# Patient Record
Sex: FEMALE | Race: BLACK OR AFRICAN AMERICAN | NOT HISPANIC OR LATINO | Employment: FULL TIME | URBAN - METROPOLITAN AREA
[De-identification: names, ages, dates, MRNs, and addresses within clinical notes are randomized per-mention and may not be internally consistent; named-entity substitution may affect disease eponyms.]

---

## 2021-02-05 ENCOUNTER — RECORDS - HEALTHEAST (OUTPATIENT)
Dept: LAB | Facility: CLINIC | Age: 66
End: 2021-02-05

## 2021-02-05 LAB
SARS-COV-2 PCR COMMENT: ABNORMAL
SARS-COV-2 RNA SPEC QL NAA+PROBE: POSITIVE
SARS-COV-2 VIRUS SPECIMEN SOURCE: ABNORMAL

## 2021-06-23 ENCOUNTER — OFFICE VISIT (OUTPATIENT)
Dept: INTERNAL MEDICINE | Facility: CLINIC | Age: 66
End: 2021-06-23
Payer: COMMERCIAL

## 2021-06-23 VITALS
RESPIRATION RATE: 16 BRPM | SYSTOLIC BLOOD PRESSURE: 132 MMHG | DIASTOLIC BLOOD PRESSURE: 84 MMHG | WEIGHT: 171.6 LBS | HEIGHT: 66 IN | TEMPERATURE: 98.2 F | HEART RATE: 91 BPM | OXYGEN SATURATION: 96 % | BODY MASS INDEX: 27.58 KG/M2

## 2021-06-23 DIAGNOSIS — Z23 NEED FOR VACCINATION: ICD-10-CM

## 2021-06-23 DIAGNOSIS — Z12.11 COLON CANCER SCREENING: ICD-10-CM

## 2021-06-23 DIAGNOSIS — E11.9 CONTROLLED TYPE 2 DIABETES MELLITUS WITHOUT COMPLICATION, WITH LONG-TERM CURRENT USE OF INSULIN (H): ICD-10-CM

## 2021-06-23 DIAGNOSIS — E78.2 MIXED HYPERLIPIDEMIA: ICD-10-CM

## 2021-06-23 DIAGNOSIS — Z79.4 CONTROLLED TYPE 2 DIABETES MELLITUS WITHOUT COMPLICATION, WITH LONG-TERM CURRENT USE OF INSULIN (H): ICD-10-CM

## 2021-06-23 DIAGNOSIS — I10 ESSENTIAL HYPERTENSION: ICD-10-CM

## 2021-06-23 DIAGNOSIS — Z00.00 ENCOUNTER FOR PREVENTATIVE ADULT HEALTH CARE EXAMINATION: Primary | ICD-10-CM

## 2021-06-23 LAB
ERYTHROCYTE [DISTWIDTH] IN BLOOD BY AUTOMATED COUNT: 14.8 % (ref 10–15)
HBA1C MFR BLD: 10.6 % (ref 0–5.6)
HCT VFR BLD AUTO: 40.4 % (ref 35–47)
HGB BLD-MCNC: 13.1 G/DL (ref 11.7–15.7)
MCH RBC QN AUTO: 26.1 PG (ref 26.5–33)
MCHC RBC AUTO-ENTMCNC: 32.4 G/DL (ref 31.5–36.5)
MCV RBC AUTO: 81 FL (ref 78–100)
PLATELET # BLD AUTO: 362 10E9/L (ref 150–450)
RBC # BLD AUTO: 5.01 10E12/L (ref 3.8–5.2)
WBC # BLD AUTO: 6.7 10E9/L (ref 4–11)

## 2021-06-23 PROCEDURE — G0009 ADMIN PNEUMOCOCCAL VACCINE: HCPCS | Performed by: INTERNAL MEDICINE

## 2021-06-23 PROCEDURE — 80053 COMPREHEN METABOLIC PANEL: CPT | Performed by: INTERNAL MEDICINE

## 2021-06-23 PROCEDURE — 83036 HEMOGLOBIN GLYCOSYLATED A1C: CPT | Performed by: INTERNAL MEDICINE

## 2021-06-23 PROCEDURE — 85027 COMPLETE CBC AUTOMATED: CPT | Performed by: INTERNAL MEDICINE

## 2021-06-23 PROCEDURE — G0123 SCREEN CERV/VAG THIN LAYER: HCPCS | Performed by: INTERNAL MEDICINE

## 2021-06-23 PROCEDURE — 80061 LIPID PANEL: CPT | Performed by: INTERNAL MEDICINE

## 2021-06-23 PROCEDURE — 36415 COLL VENOUS BLD VENIPUNCTURE: CPT | Performed by: INTERNAL MEDICINE

## 2021-06-23 PROCEDURE — 99387 INIT PM E/M NEW PAT 65+ YRS: CPT | Mod: 25 | Performed by: INTERNAL MEDICINE

## 2021-06-23 PROCEDURE — 87624 HPV HI-RISK TYP POOLED RSLT: CPT | Performed by: INTERNAL MEDICINE

## 2021-06-23 PROCEDURE — 99213 OFFICE O/P EST LOW 20 MIN: CPT | Mod: 25 | Performed by: INTERNAL MEDICINE

## 2021-06-23 PROCEDURE — 90670 PCV13 VACCINE IM: CPT | Performed by: INTERNAL MEDICINE

## 2021-06-23 PROCEDURE — 82043 UR ALBUMIN QUANTITATIVE: CPT | Performed by: INTERNAL MEDICINE

## 2021-06-23 PROCEDURE — 84443 ASSAY THYROID STIM HORMONE: CPT | Performed by: INTERNAL MEDICINE

## 2021-06-23 RX ORDER — AMLODIPINE BESYLATE 10 MG/1
10 TABLET ORAL ONCE
COMMUNITY
Start: 2021-04-09 | End: 2021-08-03

## 2021-06-23 RX ORDER — GLIPIZIDE 10 MG/1
10 TABLET, FILM COATED, EXTENDED RELEASE ORAL 2 TIMES DAILY
COMMUNITY
Start: 2021-06-11 | End: 2021-08-03

## 2021-06-23 RX ORDER — METOPROLOL SUCCINATE 50 MG/1
50 TABLET, EXTENDED RELEASE ORAL
COMMUNITY
Start: 2021-06-11 | End: 2021-08-03

## 2021-06-23 RX ORDER — METOPROLOL SUCCINATE 50 MG/1
TABLET, EXTENDED RELEASE ORAL
COMMUNITY
Start: 2021-04-06 | End: 2023-01-02

## 2021-06-23 SDOH — HEALTH STABILITY: MENTAL HEALTH: HOW OFTEN DO YOU HAVE A DRINK CONTAINING ALCOHOL?: NEVER

## 2021-06-23 ASSESSMENT — MIFFLIN-ST. JEOR: SCORE: 1340.12

## 2021-06-23 NOTE — NURSING NOTE
Prior to immunization administration, verified patients identity using patient s name and date of birth. Please see Immunization Activity for additional information.     Screening Questionnaire for Adult Immunization    Are you sick today?   No   Do you have allergies to medications, food, a vaccine component or latex?   No   Have you ever had a serious reaction after receiving a vaccination?   No   Do you have a long-term health problem with heart, lung, kidney, or metabolic disease (e.g., diabetes), asthma, a blood disorder, no spleen, complement component deficiency, a cochlear implant, or a spinal fluid leak?  Are you on long-term aspirin therapy?   No   Do you have cancer, leukemia, HIV/AIDS, or any other immune system problem?   No   Do you have a parent, brother, or sister with an immune system problem?   No   In the past 3 months, have you taken medications that affect  your immune system, such as prednisone, other steroids, or anticancer drugs; drugs for the treatment of rheumatoid arthritis, Crohn s disease, or psoriasis; or have you had radiation treatments?   No   Have you had a seizure, or a brain or other nervous system problem?   No   During the past year, have you received a transfusion of blood or blood    products, or been given immune (gamma) globulin or antiviral drug?   No   For women: Are you pregnant or is there a chance you could become       pregnant during the next month?   No   Have you received any vaccinations in the past 4 weeks?   No     Immunization questionnaire answers were all negative.        Per orders of Dr. Gray , injection of Prevnar 13  given by Emerald Olivera LPN. Patient instructed to remain in clinic for 15 minutes afterwards, and to report any adverse reaction to me immediately.       Screening performed by Emerald Olivera LPN on 6/23/2021 at 11:44 AM.
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
## 2021-06-23 NOTE — NURSING NOTE
"Physical and Establishing care  /84 (BP Location: Left arm, Patient Position: Sitting, Cuff Size: Adult Regular)   Pulse 91   Temp 98.2  F (36.8  C) (Oral)   Resp 16   Ht 1.676 m (5' 6\")   Wt 77.8 kg (171 lb 9.6 oz)   LMP  (LMP Unknown)   SpO2 96%   Breastfeeding No   BMI 27.70 kg/m       "

## 2021-06-24 LAB
ALBUMIN SERPL-MCNC: 3.4 G/DL (ref 3.4–5)
ALP SERPL-CCNC: 144 U/L (ref 40–150)
ALT SERPL W P-5'-P-CCNC: 21 U/L (ref 0–50)
ANION GAP SERPL CALCULATED.3IONS-SCNC: 5 MMOL/L (ref 3–14)
AST SERPL W P-5'-P-CCNC: 12 U/L (ref 0–45)
BILIRUB SERPL-MCNC: 0.2 MG/DL (ref 0.2–1.3)
BUN SERPL-MCNC: 16 MG/DL (ref 7–30)
CALCIUM SERPL-MCNC: 9.2 MG/DL (ref 8.5–10.1)
CHLORIDE SERPL-SCNC: 101 MMOL/L (ref 94–109)
CHOLEST SERPL-MCNC: 229 MG/DL
CO2 SERPL-SCNC: 29 MMOL/L (ref 20–32)
CREAT SERPL-MCNC: 0.7 MG/DL (ref 0.52–1.04)
CREAT UR-MCNC: 128 MG/DL
GFR SERPL CREATININE-BSD FRML MDRD: >90 ML/MIN/{1.73_M2}
GLUCOSE SERPL-MCNC: 268 MG/DL (ref 70–99)
HDLC SERPL-MCNC: 52 MG/DL
LDLC SERPL CALC-MCNC: 143 MG/DL
MICROALBUMIN UR-MCNC: 257 MG/L
MICROALBUMIN/CREAT UR: 200.78 MG/G CR (ref 0–25)
NONHDLC SERPL-MCNC: 177 MG/DL
POTASSIUM SERPL-SCNC: 4.1 MMOL/L (ref 3.4–5.3)
PROT SERPL-MCNC: 8.3 G/DL (ref 6.8–8.8)
SODIUM SERPL-SCNC: 135 MMOL/L (ref 133–144)
TRIGL SERPL-MCNC: 171 MG/DL
TSH SERPL DL<=0.005 MIU/L-ACNC: 1.04 MU/L (ref 0.4–4)

## 2021-06-25 LAB
COPATH REPORT: NORMAL
PAP: NORMAL

## 2021-06-28 LAB
FINAL DIAGNOSIS: NORMAL
HPV HR 12 DNA CVX QL NAA+PROBE: NEGATIVE
HPV16 DNA SPEC QL NAA+PROBE: NEGATIVE
HPV18 DNA SPEC QL NAA+PROBE: NEGATIVE
SPECIMEN DESCRIPTION: NORMAL
SPECIMEN SOURCE CVX/VAG CYTO: NORMAL

## 2021-07-01 DIAGNOSIS — E11.9 CONTROLLED TYPE 2 DIABETES MELLITUS WITHOUT COMPLICATION, WITH LONG-TERM CURRENT USE OF INSULIN (H): Primary | ICD-10-CM

## 2021-07-01 DIAGNOSIS — Z79.4 CONTROLLED TYPE 2 DIABETES MELLITUS WITHOUT COMPLICATION, WITH LONG-TERM CURRENT USE OF INSULIN (H): Primary | ICD-10-CM

## 2021-07-05 NOTE — TELEPHONE ENCOUNTER
Patient called to check status of medication refills  
Patient calls to check status of refill.    
Patient informed prescriptions e-scribed.  
Pending Prescriptions:                       Disp   Refills    insulin aspart prot & aspart (NOVOLOG MIX *                Sig: Inject 12 Units Subcutaneous as needed    metFORMIN (GLUCOPHAGE) 1000 MG tablet                        Routing refill request to provider for review/approval because:  Medication is reported/historical        
n/a

## 2021-07-09 ENCOUNTER — HOSPITAL ENCOUNTER (OUTPATIENT)
Facility: CLINIC | Age: 66
End: 2021-07-09
Attending: INTERNAL MEDICINE | Admitting: INTERNAL MEDICINE
Payer: COMMERCIAL

## 2021-07-10 DIAGNOSIS — Z11.59 ENCOUNTER FOR SCREENING FOR OTHER VIRAL DISEASES: ICD-10-CM

## 2021-08-02 ENCOUNTER — NURSE TRIAGE (OUTPATIENT)
Dept: NURSING | Facility: CLINIC | Age: 66
End: 2021-08-02

## 2021-08-02 NOTE — TELEPHONE ENCOUNTER
.Clinic Action Needed: Yes.     Reason for Call: patient calling requesting refill for the following medication.     amLODIPine (NORVASC) 10 MG tablet    metoprolol succinate ER (TOPROL-XL) 50 MG 24 hr tablet    glipiZIDE (GLUCOTROL XL) 10 MG 24 hr tablet    States has about 5 tablets left of each medication.      Routed to: AARON WEAVER [[id number]]    Feliciano Daniels RN  [facility]

## 2021-08-03 DIAGNOSIS — Z79.4 CONTROLLED TYPE 2 DIABETES MELLITUS WITHOUT COMPLICATION, WITH LONG-TERM CURRENT USE OF INSULIN (H): Primary | ICD-10-CM

## 2021-08-03 DIAGNOSIS — I10 ESSENTIAL HYPERTENSION: ICD-10-CM

## 2021-08-03 DIAGNOSIS — E11.9 CONTROLLED TYPE 2 DIABETES MELLITUS WITHOUT COMPLICATION, WITH LONG-TERM CURRENT USE OF INSULIN (H): Primary | ICD-10-CM

## 2021-08-03 RX ORDER — GLIPIZIDE 10 MG/1
10 TABLET, FILM COATED, EXTENDED RELEASE ORAL 2 TIMES DAILY
Qty: 90 TABLET | Refills: 0 | Status: SHIPPED | OUTPATIENT
Start: 2021-08-03 | End: 2021-10-11

## 2021-08-03 RX ORDER — AMLODIPINE BESYLATE 10 MG/1
10 TABLET ORAL DAILY
Qty: 90 TABLET | Refills: 1 | Status: SHIPPED | OUTPATIENT
Start: 2021-08-03 | End: 2022-01-21

## 2021-08-03 RX ORDER — METOPROLOL SUCCINATE 50 MG/1
50 TABLET, EXTENDED RELEASE ORAL DAILY
Qty: 90 TABLET | Refills: 1 | Status: SHIPPED | OUTPATIENT
Start: 2021-08-03

## 2021-08-03 NOTE — TELEPHONE ENCOUNTER
Refill request for Glipizide, Metoprolol, and Amlodipine.   Provider approval needed.     Last OV on 6/23/21    BP Readings from Last 3 Encounters:   06/23/21 132/84       Lab Results   Component Value Date    A1C 10.6 06/23/2021     Creatinine   Date Value Ref Range Status   06/23/2021 0.70 0.52 - 1.04 mg/dL Final

## 2021-10-07 DIAGNOSIS — Z79.4 CONTROLLED TYPE 2 DIABETES MELLITUS WITHOUT COMPLICATION, WITH LONG-TERM CURRENT USE OF INSULIN (H): ICD-10-CM

## 2021-10-07 DIAGNOSIS — E11.9 CONTROLLED TYPE 2 DIABETES MELLITUS WITHOUT COMPLICATION, WITH LONG-TERM CURRENT USE OF INSULIN (H): ICD-10-CM

## 2021-10-11 RX ORDER — GLIPIZIDE 10 MG/1
TABLET, FILM COATED, EXTENDED RELEASE ORAL
Qty: 90 TABLET | Refills: 0 | Status: SHIPPED | OUTPATIENT
Start: 2021-10-11 | End: 2021-11-30

## 2021-10-11 RX ORDER — FLURBIPROFEN SODIUM 0.3 MG/ML
SOLUTION/ DROPS OPHTHALMIC
Qty: 100 EACH | Refills: 1 | Status: SHIPPED | OUTPATIENT
Start: 2021-10-11 | End: 2022-01-21

## 2021-10-11 RX ORDER — INSULIN ASPART 100 [IU]/ML
INJECTION, SUSPENSION SUBCUTANEOUS
Qty: 15 ML | Refills: 1 | Status: SHIPPED | OUTPATIENT
Start: 2021-10-11 | End: 2022-01-07

## 2021-11-27 DIAGNOSIS — Z79.4 CONTROLLED TYPE 2 DIABETES MELLITUS WITHOUT COMPLICATION, WITH LONG-TERM CURRENT USE OF INSULIN (H): ICD-10-CM

## 2021-11-27 DIAGNOSIS — E11.9 CONTROLLED TYPE 2 DIABETES MELLITUS WITHOUT COMPLICATION, WITH LONG-TERM CURRENT USE OF INSULIN (H): ICD-10-CM

## 2021-11-30 RX ORDER — GLIPIZIDE 10 MG/1
TABLET, FILM COATED, EXTENDED RELEASE ORAL
Qty: 90 TABLET | Refills: 0 | Status: SHIPPED | OUTPATIENT
Start: 2021-11-30 | End: 2022-01-07

## 2021-11-30 NOTE — TELEPHONE ENCOUNTER
Routing refill request to provider for review/approval because:  Hemoglobin A1C   Date Value Ref Range Status   06/23/2021 10.6 (H) 0 - 5.6 % Final     Comment:     Results confirmed by repeat test  Normal <5.7% Prediabetes 5.7-6.4%  Diabetes 6.5% or higher - adopted from ADA   consensus guidelines.

## 2022-01-04 DIAGNOSIS — Z79.4 CONTROLLED TYPE 2 DIABETES MELLITUS WITHOUT COMPLICATION, WITH LONG-TERM CURRENT USE OF INSULIN (H): ICD-10-CM

## 2022-01-04 DIAGNOSIS — I10 ESSENTIAL HYPERTENSION: Primary | ICD-10-CM

## 2022-01-04 DIAGNOSIS — E11.9 CONTROLLED TYPE 2 DIABETES MELLITUS WITHOUT COMPLICATION, WITH LONG-TERM CURRENT USE OF INSULIN (H): ICD-10-CM

## 2022-01-06 DIAGNOSIS — Z79.4 CONTROLLED TYPE 2 DIABETES MELLITUS WITHOUT COMPLICATION, WITH LONG-TERM CURRENT USE OF INSULIN (H): ICD-10-CM

## 2022-01-06 DIAGNOSIS — E11.9 CONTROLLED TYPE 2 DIABETES MELLITUS WITHOUT COMPLICATION, WITH LONG-TERM CURRENT USE OF INSULIN (H): ICD-10-CM

## 2022-01-06 NOTE — TELEPHONE ENCOUNTER
Routing refill request to provider for review/approval because:  Labs not current:  A1C  Patient needs to be seen because:  due for diabetes visit    Pending Prescriptions:                       Disp   Refills    metoprolol succinate ER (TOPROL-XL) 50 MG *90 tab*1        Sig: TAKE ONE TABLET BY MOUTH ONCE DAILY    glipiZIDE (GLUCOTROL XL) 10 MG 24 hr table*90 tab*0        Sig: TAKE ONE TABLET BY MOUTH TWICE A DAY

## 2022-01-07 RX ORDER — METOPROLOL SUCCINATE 50 MG/1
TABLET, EXTENDED RELEASE ORAL
Qty: 90 TABLET | Refills: 1 | Status: SHIPPED | OUTPATIENT
Start: 2022-01-07 | End: 2023-01-02

## 2022-01-07 RX ORDER — INSULIN ASPART 100 [IU]/ML
INJECTION, SUSPENSION SUBCUTANEOUS
Qty: 15 ML | Refills: 1 | Status: SHIPPED | OUTPATIENT
Start: 2022-01-07 | End: 2022-03-09

## 2022-01-07 RX ORDER — GLIPIZIDE 10 MG/1
TABLET, FILM COATED, EXTENDED RELEASE ORAL
Qty: 90 TABLET | Refills: 0 | Status: SHIPPED | OUTPATIENT
Start: 2022-01-07 | End: 2022-03-09

## 2022-01-07 NOTE — TELEPHONE ENCOUNTER
Routing refill request to provider for review/approval because:  Labs not current:    Hemoglobin A1C POCT   Date Value Ref Range Status   06/23/2021 10.6 (H) 0 - 5.6 % Final     Comment:     Results confirmed by repeat test  Normal <5.7% Prediabetes 5.7-6.4%  Diabetes 6.5% or higher - adopted from ADA   consensus guidelines.         Patient needs to be seen because:  past due for DM appt  Mayte HERNANDEZ RN, BSN

## 2022-01-20 DIAGNOSIS — I10 ESSENTIAL HYPERTENSION: ICD-10-CM

## 2022-01-20 DIAGNOSIS — E11.9 CONTROLLED TYPE 2 DIABETES MELLITUS WITHOUT COMPLICATION, WITH LONG-TERM CURRENT USE OF INSULIN (H): ICD-10-CM

## 2022-01-20 DIAGNOSIS — Z79.4 CONTROLLED TYPE 2 DIABETES MELLITUS WITHOUT COMPLICATION, WITH LONG-TERM CURRENT USE OF INSULIN (H): ICD-10-CM

## 2022-01-21 RX ORDER — FLURBIPROFEN SODIUM 0.3 MG/ML
SOLUTION/ DROPS OPHTHALMIC
Qty: 100 EACH | Refills: 0 | Status: SHIPPED | OUTPATIENT
Start: 2022-01-21 | End: 2022-03-09

## 2022-01-21 RX ORDER — AMLODIPINE BESYLATE 10 MG/1
TABLET ORAL
Qty: 90 TABLET | Refills: 1 | Status: SHIPPED | OUTPATIENT
Start: 2022-01-21

## 2022-01-21 NOTE — TELEPHONE ENCOUNTER
Medication is being filled for 1 time refill only due to:  Patient needs to be seen because needs appt.    Routing to MA/TC pool. The Pt is due for a visit with PCP for DM  Mayte HERNANDEZ RN, BSN

## 2022-03-08 DIAGNOSIS — E11.9 CONTROLLED TYPE 2 DIABETES MELLITUS WITHOUT COMPLICATION, WITH LONG-TERM CURRENT USE OF INSULIN (H): ICD-10-CM

## 2022-03-08 DIAGNOSIS — Z79.4 CONTROLLED TYPE 2 DIABETES MELLITUS WITHOUT COMPLICATION, WITH LONG-TERM CURRENT USE OF INSULIN (H): ICD-10-CM

## 2022-03-08 NOTE — LETTER
[place] [address] [facility] [address]  [phone]        March 9, 2022      Ara Newby  [address] [facility] [address]          Dear Ara Newby    APPOINTMENT REMINDER:   Our records indicates that it is time for you to be seen for a recheck.     Your current medication request will be approved for one refill but you will need to be seen before any additional refills can be approved.    Taking care of your health is important to us, and ongoing visits with your provider are vital to your care.    We look forward to seeing you in the near future.  You may call our office at [phone] to schedule a visit.    Please disregard this notice if you have already made an appointment.      Sincerely,      [facility]

## 2022-03-09 RX ORDER — FLURBIPROFEN SODIUM 0.3 MG/ML
SOLUTION/ DROPS OPHTHALMIC
Qty: 200 EACH | Refills: 0 | Status: SHIPPED | OUTPATIENT
Start: 2022-03-09

## 2022-03-09 RX ORDER — GLIPIZIDE 10 MG/1
TABLET, FILM COATED, EXTENDED RELEASE ORAL
Qty: 90 TABLET | Refills: 0 | Status: SHIPPED | OUTPATIENT
Start: 2022-03-09 | End: 2022-03-30

## 2022-03-09 RX ORDER — INSULIN ASPART 100 [IU]/ML
INJECTION, SUSPENSION SUBCUTANEOUS
Qty: 45 ML | Refills: 0 | Status: SHIPPED | OUTPATIENT
Start: 2022-03-09

## 2022-03-09 NOTE — TELEPHONE ENCOUNTER
Last OV on 6/23/21  Will send letter to schedule OV.   Provider approval needed.     Lab Results   Component Value Date    A1C 10.6 06/23/2021

## 2022-03-19 ENCOUNTER — TELEPHONE (OUTPATIENT)
Dept: INTERNAL MEDICINE | Facility: CLINIC | Age: 67
End: 2022-03-19

## 2022-03-19 DIAGNOSIS — E11.9 CONTROLLED TYPE 2 DIABETES MELLITUS WITHOUT COMPLICATION, WITH LONG-TERM CURRENT USE OF INSULIN (H): Primary | ICD-10-CM

## 2022-03-19 DIAGNOSIS — Z79.4 CONTROLLED TYPE 2 DIABETES MELLITUS WITHOUT COMPLICATION, WITH LONG-TERM CURRENT USE OF INSULIN (H): Primary | ICD-10-CM

## 2022-03-19 NOTE — TELEPHONE ENCOUNTER
Patient is requesting a new prescription to be sent to us for contour next test strips. She is testing TID.

## 2022-03-19 NOTE — LETTER
[facility]  [address], [address]  [place], [place]  [address]                                            TEL:[phone]  FAX:[phone]      Ara Newby  [address] [facility] [address]      March 21, 2022    Dear Ara,    We have received a refill request from your pharmacy for your test strips. Many medications require routine follow-up with your provider and a review of your chart indicates that you are overdue for a diabete follow up. We have sent a one time 90 day refill to your pharmacy to allow you time to schedule an appointment.     Please call [phone] to schedule an appointment.  We look forward to seeing you in the near future.      Thank you,     [facility]

## 2022-03-21 NOTE — TELEPHONE ENCOUNTER
Medication is being filled for 1 time refill only due to:  Patient needs to be seen because pt is overdue for daibetic apt.     Letter was sent 3/9/22. Will send another.

## 2022-03-29 DIAGNOSIS — Z79.4 CONTROLLED TYPE 2 DIABETES MELLITUS WITHOUT COMPLICATION, WITH LONG-TERM CURRENT USE OF INSULIN (H): ICD-10-CM

## 2022-03-29 DIAGNOSIS — E11.9 CONTROLLED TYPE 2 DIABETES MELLITUS WITHOUT COMPLICATION, WITH LONG-TERM CURRENT USE OF INSULIN (H): ICD-10-CM

## 2022-03-30 RX ORDER — GLIPIZIDE 10 MG/1
TABLET, FILM COATED, EXTENDED RELEASE ORAL
Qty: 90 TABLET | Refills: 0 | Status: SHIPPED | OUTPATIENT
Start: 2022-03-30

## 2022-03-30 NOTE — TELEPHONE ENCOUNTER
Routing refill request to provider for review/approval because:  Miryam given x1 and patient did not follow up, please advise.  Mayte HERNANDEZ RN, BSN

## 2023-01-02 ENCOUNTER — OFFICE VISIT (OUTPATIENT)
Dept: URGENT CARE | Facility: URGENT CARE | Age: 68
End: 2023-01-02
Payer: OTHER MISCELLANEOUS

## 2023-01-02 ENCOUNTER — ANCILLARY PROCEDURE (OUTPATIENT)
Dept: GENERAL RADIOLOGY | Facility: CLINIC | Age: 68
End: 2023-01-02
Payer: OTHER MISCELLANEOUS

## 2023-01-02 VITALS
SYSTOLIC BLOOD PRESSURE: 150 MMHG | DIASTOLIC BLOOD PRESSURE: 82 MMHG | BODY MASS INDEX: 28.5 KG/M2 | TEMPERATURE: 98.3 F | OXYGEN SATURATION: 98 % | WEIGHT: 176.6 LBS | HEART RATE: 79 BPM
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
## 2023-01-02 DIAGNOSIS — S52.615A CLOSED NONDISPLACED FRACTURE OF STYLOID PROCESS OF LEFT ULNA, INITIAL ENCOUNTER: ICD-10-CM

## 2023-01-02 DIAGNOSIS — S52.592A OTHER CLOSED FRACTURE OF DISTAL END OF LEFT RADIUS, INITIAL ENCOUNTER: Primary | ICD-10-CM

## 2023-01-02 PROCEDURE — 73090 X-RAY EXAM OF FOREARM: CPT | Mod: TC | Performed by: RADIOLOGY

## 2023-01-02 PROCEDURE — 73130 X-RAY EXAM OF HAND: CPT | Mod: TC | Performed by: RADIOLOGY

## 2023-01-02 PROCEDURE — 99214 OFFICE O/P EST MOD 30 MIN: CPT

## 2023-01-02 NOTE — PROGRESS NOTES
Assessment & Plan   (S52.521X) Other closed fracture of distal end of left radius, initial encounter  (primary encounter diagnosis)  Plan: ARM SLING L, Orthopedic  Referral,         Wrist/Arm/Hand Supplies Order for DME - ONLY         FOR DME    (S52.023V) Closed nondisplaced fracture of styloid process of left ulna, initial encounter    (W19.XXXA) Fall, initial encounter  Plan: XR Forearm Left 2 Views, XR Hand Left G/E 3         Views    Informed the patient that she has 2 broken bones near her wrist.  We discussed the need to keep the splint on and wear the sling until follow-up with orthopedics.  Informed the patient that orthopedics will contact her within 1-2 business days to schedule the appointment and I also identified the phone number she can use to call them for follow-up.  Instructed her to use ice and elevate her left arm to help with the pain and swelling.  We also discussed using Tylenol as needed for pain with the maximum dose being 4000 mg in a 24-hour period of time.  Informed her to return to clinic with any new or worsening symptoms.  Patient acknowledged her understanding of the above plan.    AMELIA Vincent [facility] [place]    Conor Bullock is a 67 year old female who presents to clinic today for the following health issues:  Chief Complaint   Patient presents with     Work Comp     Fall     HPI  Patient slipped and fell on the ice last Wednesday.  She reports having left wrist and lower arm pain.   The patient reports the pain as 8/10 and is described as a throbbing pain. The patient denies any numbness or tingling.  The patient has been using Tylenol and ibuprofen for pain.     Review of Systems  Negative except noted above.      Objective    Physical Exam   GENERAL: healthy, alert and no distress  MS: decreased range of motion left wrist due to pain, edema noted between the middle of the left lower arm and left wrist, tenderness to  palpation distal radial and ulnar portion of the left wrist  SKIN: no suspicious lesions or rashes

## 2023-01-02 NOTE — PATIENT INSTRUCTIONS
Keep the splint on and wear the sling until follow up with orthopedics.  Use ice and elevate your left arm to help with the pain and swelling.  You can use Tylenol as needed for pain.  Maximum dose of Tylenol is 4000mg in a 24 hour period of time.

## 2023-01-02 NOTE — LETTER
January 2, 2023      Ara Newby  [address] [facility] [address]        To Whom It May Concern:    Ara Newby  was seen on January 2, 2023.  Please provide her with light work duties due to injury until followed up by orthopedics.        Sincerely,        AMELIA Vincent CNP

## 2023-01-14 ENCOUNTER — APPOINTMENT (OUTPATIENT)
Dept: GENERAL RADIOLOGY | Facility: CLINIC | Age: 68
End: 2023-01-14
Attending: FAMILY MEDICINE
Payer: OTHER MISCELLANEOUS

## 2023-01-14 ENCOUNTER — HOSPITAL ENCOUNTER (EMERGENCY)
Facility: CLINIC | Age: 68
Discharge: HOME | End: 2023-01-14
Attending: FAMILY MEDICINE | Admitting: FAMILY MEDICINE
Payer: OTHER MISCELLANEOUS

## 2023-01-14 VITALS
SYSTOLIC BLOOD PRESSURE: 151 MMHG | HEART RATE: 88 BPM | OXYGEN SATURATION: 99 % | TEMPERATURE: 97.9 F | RESPIRATION RATE: 16 BRPM | DIASTOLIC BLOOD PRESSURE: 88 MMHG

## 2023-01-14 DIAGNOSIS — S52.502A CLOSED FRACTURE OF DISTAL ENDS OF LEFT RADIUS AND ULNA, INITIAL ENCOUNTER: ICD-10-CM

## 2023-01-14 DIAGNOSIS — S52.602A CLOSED FRACTURE OF DISTAL ENDS OF LEFT RADIUS AND ULNA, INITIAL ENCOUNTER: ICD-10-CM

## 2023-01-14 PROCEDURE — 99284 EMERGENCY DEPT VISIT MOD MDM: CPT | Performed by: FAMILY MEDICINE

## 2023-01-14 PROCEDURE — 99284 EMERGENCY DEPT VISIT MOD MDM: CPT | Mod: 25 | Performed by: FAMILY MEDICINE

## 2023-01-14 PROCEDURE — 29125 APPL SHORT ARM SPLINT STATIC: CPT | Mod: LT | Performed by: FAMILY MEDICINE

## 2023-01-14 PROCEDURE — 73110 X-RAY EXAM OF WRIST: CPT | Mod: LT

## 2023-01-14 PROCEDURE — 250N000013 HC RX MED GY IP 250 OP 250 PS 637: Performed by: FAMILY MEDICINE

## 2023-01-14 RX ORDER — OXYCODONE HYDROCHLORIDE 5 MG/1
5 TABLET ORAL ONCE
Status: COMPLETED | OUTPATIENT
Start: 2023-01-14 | End: 2023-01-14

## 2023-01-14 RX ORDER — IBUPROFEN 200 MG
600 TABLET ORAL EVERY 6 HOURS PRN
Qty: 30 TABLET | Refills: 0 | Status: SHIPPED | OUTPATIENT
Start: 2023-01-14

## 2023-01-14 RX ORDER — OXYCODONE HYDROCHLORIDE 5 MG/1
5 TABLET ORAL EVERY 6 HOURS PRN
Qty: 12 TABLET | Refills: 0 | Status: SHIPPED | OUTPATIENT
Start: 2023-01-14 | End: 2023-01-17

## 2023-01-14 RX ADMIN — OXYCODONE HYDROCHLORIDE 5 MG: 5 TABLET ORAL at 10:52

## 2023-01-14 ASSESSMENT — ACTIVITIES OF DAILY LIVING (ADL): ADLS_ACUITY_SCORE: 35

## 2023-01-14 NOTE — LETTER
[facility] EMERGENCY DEPARTMENT  [address]  [place] [address]  [phone]      2023    Ara Newby  [address] [facility]  [place] [address]  [phone] (home)     : 1955      To Whom it may concern:    Ara Newby was seen in our Emergency Department today, 2023 for an injury that was reported to be work related.      For the next 1 days she should not work    The employee might be taking medication so that they cannot operate moving machinery or perform activities that require balancing or working above ground.    After returning to work the following restrictions apply for 3 days:   No use of left upper extremity.    The employee must keep the injured site elevated.  Patient will need to wear the splint.  Must follow-up with orthopedics for further work instructions.    Sincerely,    Saul West MD

## 2023-01-14 NOTE — ED NOTES
Pt to discharge home. Home rx and AVS discussed with  ipad. Answered all questions at this time. Sling provided.

## 2023-01-14 NOTE — ED TRIAGE NOTES
Had a fall on December 28th. Arm is in a sling and ace wrapped. Increased pain started Tuesday     Triage Assessment     Row Name 01/14/23 0952       Triage Assessment (Adult)    Airway WDL WDL       Respiratory WDL    Respiratory WDL WDL       Skin Circulation/Temperature WDL    Skin Circulation/Temperature WDL WDL       Cardiac WDL    Cardiac WDL WDL       Peripheral/Neurovascular WDL    Peripheral Neurovascular WDL WDL       Cognitive/Neuro/Behavioral WDL    Cognitive/Neuro/Behavioral WDL WDL

## 2023-01-14 NOTE — DISCHARGE INSTRUCTIONS
Wear splint and sling, ice and elevate is much as possible.  Use ibuprofen, Tylenol or oxycodone for pain.  Follow-up with orthopedics.  See phone number below if they do not contact you within the next 1-2 business days.  Please make an appointment to follow up with Orthopedics Clinic (phone: [phone]) as soon as possible.

## 2023-01-14 NOTE — ED PROVIDER NOTES
ED Provider Note  [facility]      History     Chief Complaint   Patient presents with     Arm Pain     Fell dec 28th. New pain started tuesday     HPI  Aravenessa Newby is a 67 year old female with history of DMII, HTN, and recent left radial head and ulnar styloid process (fall 12/28, XR on 1/2 at ) who presents to the ED with increased left arm pain since 1/10.     Per chart review patient was seen at  Urgent Care in [place] on 1/2 with left wrist and lower arm pain after a fall on 12/28. XE showed distal radial fracture as well as nondisplaced fracture of the ulnar styloid process. Given splint and sling and Ortho to contact in 1-2 days for f/u appt. Advised ice and elevation w/ Tylenol for pain. Patient called by Ortho 1/12 to schedule appt as non-operative start. Patient then returned call with report of increased pain since 1/6. Advised to be seen in Urgent Care, however, pt without ride until 10 pm when son returned home. Advised if new or worsening sx pt should be seen in ED.     XR Hand Left 1/2/23  IMPRESSION: Acute fracture of the distal radial metaphysis with  extension to the radiocarpal articular surface. Slight impaction and  the metaphysis. Nondisplaced fracture of the ulnar styloid. Mild  degenerative changes in the wrist and hand.    XR Forearm 1/2/23  IMPRESSION: Acute fracture of the distal radial metaphysis and ulnar  styloid. No proximal fractures are evident.     Past Medical History  Past Medical History:   Diagnosis Date     Diabetes mellitus, type 2 (H)      Hypertension      History reviewed. No pertinent surgical history.  amLODIPine (NORVASC) 10 MG tablet  blood glucose (NO BRAND SPECIFIED) test strip  glipiZIDE (GLUCOTROL XL) 10 MG 24 hr tablet  ibuprofen (ADVIL/MOTRIN) 200 MG tablet  insulin aspart prot & aspart (NOVOLOG MIX 70/30 FLEXPEN) (70-30) 100 UNIT/ML pen  insulin pen needle (B-D U/F) 31G X 5 MM miscellaneous  metFORMIN (GLUCOPHAGE) 1000 MG  tablet  metoprolol succinate ER (TOPROL-XL) 50 MG 24 hr tablet  oxyCODONE (ROXICODONE) 5 MG tablet      No Known Allergies  Family History  History reviewed. No pertinent family history.  Social History   Social History     Tobacco Use     Smoking status: Never     Smokeless tobacco: Never   Substance Use Topics     Alcohol use: Never     Drug use: Never      Past medical history, past surgical history, medications, allergies, family history, and social history were reviewed with the patient. No additional pertinent items.      A complete review of systems was performed with pertinent positives and negatives noted in the HPI, and all other systems negative.    Physical Exam   BP: (!) 151/88  Pulse: 88  Temp: 97.9  F (36.6  C)  Resp: 16  SpO2: 99 %  Physical Exam  Vitals and nursing note reviewed.   Constitutional:       General: She is not in acute distress.     Appearance: She is not diaphoretic.   HENT:      Head: Atraumatic.      Mouth/Throat:      Pharynx: No oropharyngeal exudate.   Eyes:      General: No scleral icterus.     Pupils: Pupils are equal, round, and reactive to light.   Cardiovascular:      Heart sounds: Normal heart sounds.   Pulmonary:      Effort: No respiratory distress.      Breath sounds: Normal breath sounds.   Abdominal:      General: Bowel sounds are normal.      Palpations: Abdomen is soft.      Tenderness: There is no abdominal tenderness.   Musculoskeletal:      Left wrist: Swelling and tenderness present. No deformity. Normal pulse.      Comments: Patient is wearing his splint on the left wrist which appears to be sliding out of position.  The sensation and capillary refill is normal in the digits, there is no sign of compartment syndrome.  Splint removed the skin is intact.   Skin:     General: Skin is warm.      Findings: No rash.   Neurological:      General: No focal deficit present.      Mental Status: She is oriented to person, place, and time.           ED Course, Procedures, &  Data      Procedures                     Results for orders placed or performed during the hospital encounter of 01/14/23   XR Wrist Left G/E 3 Views     Status: None    Narrative    EXAM: XR WRIST LEFT G/E 3 VIEWS  LOCATION: [facility]  DATE/TIME: 01/14/2023, 11:03 AM    INDICATION: Known fracture, splint displaced, increased pain.  COMPARISON: 01/02/2023.      Impression    IMPRESSION: Again seen is an impacted intra-articular distal radius fracture and slightly distracted ulnar styloid fracture. Fracture planes are still visible without significant interval healing since the previous study. No new findings. Small lucency   in the thumb metacarpal shaft measuring 6 mm, nonspecific but may reflect an enchondroma.       Medications   oxyCODONE (ROXICODONE) tablet 5 mg (5 mg Oral Given 1/14/23 1052)     Labs Ordered and Resulted from Time of ED Arrival to Time of ED Departure - No data to display  XR Wrist Left G/E 3 Views   Final Result   IMPRESSION: Again seen is an impacted intra-articular distal radius fracture and slightly distracted ulnar styloid fracture. Fracture planes are still visible without significant interval healing since the previous study. No new findings. Small lucency    in the thumb metacarpal shaft measuring 6 mm, nonspecific but may reflect an enchondroma.                Medical Decision Making  The patient presented with a problem that is an acute complicated injury.    The patient's evaluation involved:  review of 1 prior external note(s) (see separate area of note for details)  ordering and review of 1 test(s) (see separate area of note for details)    The patient's management involved prescription drug management.      Assessment & Plan    A 67-year-old female who fell on December 28 and suffered a nondisplaced intra-articular distal radius and ulna fracture.  Presenting now for the second time (first time on January 6, 2023 to urgent care) due  to intractable pain at the site of the fracture.  On exam she appears in only mild distress.  She has a splint in place which appears to have slipped some and is somewhat out of position.  There is no sign of vascular or neurologic compromise of her digits.  The splint was removed and there is no breakdown of the skin.  Radial pulse normal, no new deformity.  Imaging was repeated this does not show any new displacement or changes in the appearance of the fracture.  She was replaced in a plaster sugar-tong splint and reported substantial improvement in her pain.  Was also treated with oxycodone in the ED.  Based on the clinical findings and the entire clinical scenario, the patient appears stable at this time to be treated symptomatically, and to follow-up as an outpatient for any further evaluation and treatment.  Will refer to orthopedics for consultation regarding ongoing management discussed expected course, need for follow up, and indications for return with the patient.  See discharge instructions.      I have reviewed the nursing notes. I have reviewed the findings, diagnosis, plan and need for follow up with the patient.    Discharge Medication List as of 1/14/2023 11:36 AM      START taking these medications    Details   ibuprofen (ADVIL/MOTRIN) 200 MG tablet Take 3 tablets (600 mg) by mouth every 6 hours as needed for mild pain, Disp-30 tablet, R-0, Local Print      oxyCODONE (ROXICODONE) 5 MG tablet Take 1 tablet (5 mg) by mouth every 6 hours as needed for pain, Disp-12 tablet, R-0, Local Print             Final diagnoses:   Closed fracture of distal ends of left radius and ulna, initial encounter       Saul West MD  [facility] ED EMERGENCY DEPARTMENT  1/14/2023     Saul West MD  01/14/23 2100

## 2023-01-16 NOTE — PROGRESS NOTES
Chief Complaint:   Chief Complaint   Patient presents with     Left Wrist - Pain     W/C. Fracture. DOI 12/28/22, 3 wk s/p. Patient notes she was taking out the trash can and she fell on the snow injuring her left wrist. She was seen at the hospital twice. She was placed in a plaster splint but had trouble with it so she went back and they put her in a different splint. She has remained in splint. Hand appears to be swollen.       INJURY: nondisplaced left distal radius fracture  DATE of INJURY: 12/28/2022    Today's encounter utilized a language specific  via iPad to obtain the HPI, PAST MEDICAL/SURGICAL history, social history, family history, medications and allergies and review of systems; in addition to perform physical examination; review pertinent imaging studies; discuss exam findings and assessment; and go over treatment plan.    Ara Newby is seen today in the [facility] for evaluation of left wrist fracture  at the request of Dr. Saul West         HPI: Ara Newby is a 67 year old female , right -hand dominant, who presents for evaluation and management of a left wrist injury. 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  Seen in Urgent Care 1/2/2023, with xrays of the forearm obtained noting a nondisplaced distal radius and ulna fracture. Had worsened pain due to the splint not fitting well, so presented to [facility] ED on 1/14/2023, with repeat xrays showing stable fracture. Resplinted. Presents today for followup management, overall improved.    Takes tylenol, oxycodone for pain.      It has been 3 weeks since the initial injury.      She is diabetic, last A1c=10.6  in our system 6/23/2021.      She reports having mild pain/discomfort around the injury site. She denies associated numbness or tingling. She denies any other injuries to her upper extremity.   Symptoms: pain,  swelling.  Location of symptoms: left wrist.  Pain severity: 3/10  Pain quality: dull, aching and throbbing  Frequency of symptoms: are constant  Aggravating factors: movements.  Relieving factors: rest, splint, pain medications (oxycodone, tylenol).      Past medical history:  has a past medical history of Diabetes mellitus, type 2 (H) and Hypertension.   There are no problems to display for this patient.    Past surgical history:  has no past surgical history on file.     Medications:    Current Outpatient Medications   Medication Sig Dispense Refill     amLODIPine (NORVASC) 10 MG tablet TAKE ONE TABLET BY MOUTH ONCE DAILY 90 tablet 1     blood glucose (NO BRAND SPECIFIED) test strip Use to test blood sugar 3 times daily or as directed. 300 strip 0     glipiZIDE (GLUCOTROL XL) 10 MG 24 hr tablet TAKE ONE TABLET BY MOUTH TWICE A DAY 90 tablet 0     ibuprofen (ADVIL/MOTRIN) 200 MG tablet Take 3 tablets (600 mg) by mouth every 6 hours as needed for mild pain 30 tablet 0     insulin aspart prot & aspart (NOVOLOG MIX 70/30 FLEXPEN) (70-30) 100 UNIT/ML pen INJECT 16 UNITS TWICE DAILY AS NEEDED FOR HIGH BLOOD SUGAR 45 mL 0     insulin pen needle (B-D U/F) 31G X 5 MM miscellaneous Use twice daily with Novolog pen 200 each 0     metFORMIN (GLUCOPHAGE) 1000 MG tablet Take 1 tablet (1,000 mg) by mouth 2 times daily (with meals) 360 tablet 1     metoprolol succinate ER (TOPROL-XL) 50 MG 24 hr tablet Take 1 tablet (50 mg) by mouth daily 90 tablet 1     oxyCODONE (ROXICODONE) 5 MG tablet Take 1 tablet (5 mg) by mouth every 6 hours as needed for pain 12 tablet 0        Allergies:   No Known Allergies     Family History: family history is not on file.     Social History:  reports that she has never smoked. She has never used smokeless tobacco. She reports that she does not drink alcohol and does not use drugs.    Review of Systems:  ROS: 10 point ROS neg other than the symptoms noted above in the HPI and past medical  "history.    Physical Exam  GENERAL APPEARANCE: healthy, alert, no distress.   SKIN: no suspicious lesions or rashes  NEURO: Normal strength and tone, mentation intact and speech normal  PSYCH:  mentation appears normal and affect normal. Not anxious.  RESPIRATORY: No increased work of breathing.    BP (!) 148/80   Pulse 93   Ht 1.676 m (5' 6\")   Wt 80.7 kg (178 lb)   LMP  (LMP Unknown)   BMI 28.73 kg/m       left WRIST/HAND EXAM:    The splint was removed    Skin intact. Resolving volar ecchymosis.  Normal wear pattern, color and tone.    There is mild swelling in the hand, fingers, wrist of the left upper extremity .  There is moderate tenderness in the distal radius, distal ulna of the wrist.  There is no maceration of the skin.  There is no gross deformity in the area.    Intact sensation light touch median, radial, ulnar nerves of the hand  Intact sensation to the radial and ulnar digital nerves of the fingers, as well as the finger tips.  Intact epl fpl fdp edc wrist flexion/extension biceps/triceps deltoid  Brisk capillary refill to all fingers.   Palpable radial pulse, 2+.    X-rays:  3 views left wrist from 1/14/2023 were reviewed in clinic today. On my review, stable alignment of previously noted impacted intra-articular distal radius fracture and slightly distracted ulnar styloid fracture. Fracture planes are still visible without significant interval healing since the previous study on 1/2/2023. No new findings. Small lucency in the thumb metacarpal shaft measuring 6 mm, nonspecific but may reflect an enchondroma. Findings grossly stable to images 1/2/2023.    .    Assessment: 66yo RHD female with nondisplaced, impacted left distal radius fracture status post fall..    Plan:  Nonop fracture management    * exam, images reviewed  * consistent with a fracture, reason for continued pain.  * will take 8-12 weeks to heal  * typically immobilize in cast/brace 6-8 weeks depending on fracture, healing.  * at " this time, will plan to place into a cast today..  * return to clinic 4 weeks, xrays left wrist out of cast, sooner if needed.  * over the counter pain control as needed. Will do a prescription for Norco today.  * non weight bearing left upper extremity, finger range of motion.    * All questions were addressed and answered prior to discharge from clinic today. The patient acknowledges an understanding of and agreement with the plan set forth during today's visit. Patient was advised to call our office or MyChart us if any further questions arise upon leaving our office today.        Kaushik Zhou M.D., M.S.  Dept. of Orthopaedic Surgery  [facility]      Cast/splint application    Date/Time: 1/19/2023 4:47 PM  Performed by: Devon Hernandez PA  Authorized by: Kaushik Zhou MD     Consent:     Consent obtained:  Verbal    Consent given by:  Patient  Pre-procedure details:     Sensation:  Normal  Procedure details:     Laterality:  Left    Location:  Wrist    Wrist:  L wrist    Cast type:  Short arm    Supplies:  Fiberglass  Post-procedure details:     Pain:  Unchanged    Sensation:  Normal    Patient tolerance of procedure:  Tolerated well, no immediate complications    Patient provided with cast or splint care instructions: Yes

## 2023-01-19 ENCOUNTER — OFFICE VISIT (OUTPATIENT)
Dept: ORTHOPEDICS | Facility: CLINIC | Age: 68
End: 2023-01-19
Payer: OTHER MISCELLANEOUS

## 2023-01-19 VITALS
WEIGHT: 178 LBS | HEIGHT: 66 IN | HEART RATE: 93 BPM | BODY MASS INDEX: 28.61 KG/M2 | SYSTOLIC BLOOD PRESSURE: 148 MMHG | DIASTOLIC BLOOD PRESSURE: 80 MMHG

## 2023-01-19 DIAGNOSIS — S52.602A CLOSED FRACTURE OF DISTAL ENDS OF LEFT RADIUS AND ULNA, INITIAL ENCOUNTER: Primary | ICD-10-CM

## 2023-01-19 DIAGNOSIS — S52.502A CLOSED FRACTURE OF DISTAL ENDS OF LEFT RADIUS AND ULNA, INITIAL ENCOUNTER: Primary | ICD-10-CM

## 2023-01-19 PROCEDURE — 25600 CLTX DST RDL FX/EPHYS SEP WO: CPT | Mod: LT | Performed by: ORTHOPAEDIC SURGERY

## 2023-01-19 PROCEDURE — 99203 OFFICE O/P NEW LOW 30 MIN: CPT | Mod: 57 | Performed by: ORTHOPAEDIC SURGERY

## 2023-01-19 RX ORDER — ACETAMINOPHEN 325 MG/1
TABLET ORAL
COMMUNITY
Start: 2022-12-27

## 2023-01-19 RX ORDER — HYDROCODONE BITARTRATE AND ACETAMINOPHEN 5; 325 MG/1; MG/1
1 TABLET ORAL EVERY 6 HOURS PRN
Qty: 10 TABLET | Refills: 0 | Status: SHIPPED | OUTPATIENT
Start: 2023-01-19 | End: 2023-01-22

## 2023-01-19 ASSESSMENT — PAIN SCALES - GENERAL: PAINLEVEL: MILD PAIN (3)

## 2023-01-19 NOTE — LETTER
1/19/2023         RE: Ara Newby  [id number] [address] [facility] [address]        Dear Colleague,    Thank you for referring your patient, Ara Newby, to the [facility] [place]. Please see a copy of my visit note below.    Chief Complaint:   Chief Complaint   Patient presents with     Left Wrist - Pain     W/C. Fracture. DOI 12/28/22, 3 wk s/p. Patient notes she was taking out the trash can and she fell on the snow injuring her left wrist. She was seen at the hospital twice. She was placed in a plaster splint but had trouble with it so she went back and they put her in a different splint. She has remained in splint. Hand appears to be swollen.       INJURY: nondisplaced left distal radius fracture  DATE of INJURY: 12/28/2022    Today's encounter utilized a language specific  via iPad to obtain the HPI, PAST MEDICAL/SURGICAL history, social history, family history, medications and allergies and review of systems; in addition to perform physical examination; review pertinent imaging studies; discuss exam findings and assessment; and go over treatment plan.    Ara Newby is seen today in the [facility] for evaluation of left wrist fracture  at the request of Dr. Saul West         HPI: Ara Newby is a 67 year old female , right -hand dominant, who presents for evaluation and management of a left wrist injury. 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  Seen in Urgent Care 1/2/2023, with xrays of the forearm obtained noting a nondisplaced distal radius and ulna fracture. Had worsened pain due to the splint not fitting well, so presented to [facility] ED on 1/14/2023, with repeat xrays showing stable fracture. Resplinted. Presents today for followup management, overall improved.    Takes tylenol, oxycodone for pain.      It has been 3 weeks since the initial injury.       She is diabetic, last A1c=10.6  in our system 6/23/2021.      She reports having mild pain/discomfort around the injury site. She denies associated numbness or tingling. She denies any other injuries to her upper extremity.   Symptoms: pain, swelling.  Location of symptoms: left wrist.  Pain severity: 3/10  Pain quality: dull, aching and throbbing  Frequency of symptoms: are constant  Aggravating factors: movements.  Relieving factors: rest, splint, pain medications (oxycodone, tylenol).      Past medical history:  has a past medical history of Diabetes mellitus, type 2 (H) and Hypertension.   There are no problems to display for this patient.    Past surgical history:  has no past surgical history on file.     Medications:    Current Outpatient Medications   Medication Sig Dispense Refill     amLODIPine (NORVASC) 10 MG tablet TAKE ONE TABLET BY MOUTH ONCE DAILY 90 tablet 1     blood glucose (NO BRAND SPECIFIED) test strip Use to test blood sugar 3 times daily or as directed. 300 strip 0     glipiZIDE (GLUCOTROL XL) 10 MG 24 hr tablet TAKE ONE TABLET BY MOUTH TWICE A DAY 90 tablet 0     ibuprofen (ADVIL/MOTRIN) 200 MG tablet Take 3 tablets (600 mg) by mouth every 6 hours as needed for mild pain 30 tablet 0     insulin aspart prot & aspart (NOVOLOG MIX 70/30 FLEXPEN) (70-30) 100 UNIT/ML pen INJECT 16 UNITS TWICE DAILY AS NEEDED FOR HIGH BLOOD SUGAR 45 mL 0     insulin pen needle (B-D U/F) 31G X 5 MM miscellaneous Use twice daily with Novolog pen 200 each 0     metFORMIN (GLUCOPHAGE) 1000 MG tablet Take 1 tablet (1,000 mg) by mouth 2 times daily (with meals) 360 tablet 1     metoprolol succinate ER (TOPROL-XL) 50 MG 24 hr tablet Take 1 tablet (50 mg) by mouth daily 90 tablet 1     oxyCODONE (ROXICODONE) 5 MG tablet Take 1 tablet (5 mg) by mouth every 6 hours as needed for pain 12 tablet 0        Allergies:   No Known Allergies     Family History: family history is not on file.     Social History:  reports that she  "has never smoked. She has never used smokeless tobacco. She reports that she does not drink alcohol and does not use drugs.    Review of Systems:  ROS: 10 point ROS neg other than the symptoms noted above in the HPI and past medical history.    Physical Exam  GENERAL APPEARANCE: healthy, alert, no distress.   SKIN: no suspicious lesions or rashes  NEURO: Normal strength and tone, mentation intact and speech normal  PSYCH:  mentation appears normal and affect normal. Not anxious.  RESPIRATORY: No increased work of breathing.    BP (!) 148/80   Pulse 93   Ht 1.676 m (5' 6\")   Wt 80.7 kg (178 lb)   LMP  (LMP Unknown)   BMI 28.73 kg/m       left WRIST/HAND EXAM:    The splint was removed    Skin intact. Resolving volar ecchymosis.  Normal wear pattern, color and tone.    There is mild swelling in the hand, fingers, wrist of the left upper extremity .  There is moderate tenderness in the distal radius, distal ulna of the wrist.  There is no maceration of the skin.  There is no gross deformity in the area.    Intact sensation light touch median, radial, ulnar nerves of the hand  Intact sensation to the radial and ulnar digital nerves of the fingers, as well as the finger tips.  Intact epl fpl fdp edc wrist flexion/extension biceps/triceps deltoid  Brisk capillary refill to all fingers.   Palpable radial pulse, 2+.    X-rays:  3 views left wrist from 1/14/2023 were reviewed in clinic today. On my review, stable alignment of previously noted impacted intra-articular distal radius fracture and slightly distracted ulnar styloid fracture. Fracture planes are still visible without significant interval healing since the previous study on 1/2/2023. No new findings. Small lucency in the thumb metacarpal shaft measuring 6 mm, nonspecific but may reflect an enchondroma. Findings grossly stable to images 1/2/2023.    .    Assessment: 66yo RHD female with nondisplaced, impacted left distal radius fracture status post " fall..    Plan:  Nonop fracture management    * exam, images reviewed  * consistent with a fracture, reason for continued pain.  * will take 8-12 weeks to heal  * typically immobilize in cast/brace 6-8 weeks depending on fracture, healing.  * at this time, will plan to place into a cast today..  * return to clinic 4 weeks, xrays left wrist out of cast, sooner if needed.  * over the counter pain control as needed. Will do a prescription for Norco today.  * non weight bearing left upper extremity, finger range of motion.    * All questions were addressed and answered prior to discharge from clinic today. The patient acknowledges an understanding of and agreement with the plan set forth during today's visit. Patient was advised to call our office or MyChart us if any further questions arise upon leaving our office today.        Kaushik Zhou M.D., M.S.  Dept. of Orthopaedic Surgery  [facility]      Cast/splint application    Date/Time: 1/19/2023 4:47 PM  Performed by: Devon Hernandez PA  Authorized by: Kaushik Zhou MD     Consent:     Consent obtained:  Verbal    Consent given by:  Patient  Pre-procedure details:     Sensation:  Normal  Procedure details:     Laterality:  Left    Location:  Wrist    Wrist:  L wrist    Cast type:  Short arm    Supplies:  Fiberglass  Post-procedure details:     Pain:  Unchanged    Sensation:  Normal    Patient tolerance of procedure:  Tolerated well, no immediate complications    Patient provided with cast or splint care instructions: Yes              Again, thank you for allowing me to participate in the care of your patient.        Sincerely,        Kaushik Zhou MD

## 2023-01-19 NOTE — LETTER
January 19, 2023      RE: Ara Newby  [id number] [address] [facility] [address]        To whom it may concern:     Ara Newby is under my care for   1. Closed fracture of distal ends of left radius and ulna, initial encounter        Work related injury: Yes       Date of injury: 12/28/22        Return to work date: 1/19/23     ** WITH RESTRICTIONS? Yes, with work restrictions: * Other: No use of left upper extremity.  DURATION OF LIMITATIONS: 4 weeks        Next appointment: 4 weeks        Electronically Signed (as below)  Dr. Kaushik Zhou M.D.

## 2023-02-13 NOTE — PROGRESS NOTES
Chief Complaint:   Chief Complaint   Patient presents with     Left Wrist - Follow Up     WORK COMP. Left wrist fracture. DOI: 12/28/22. Presents in well fitting, clean, dry cast. Reports doing well with no complaints or pain. Utilizing iPad interpretor services.      INJURY: nondisplaced left distal radius fracture  DATE of INJURY: 12/28/2022    Today's encounter utilized a language specific  via iPad to obtain the HPI, PAST MEDICAL/SURGICAL history, social history, family history, medications and allergies and review of systems; in addition to perform physical examination; review pertinent imaging studies; discuss exam findings and assessment; and go over treatment plan.        HPI: Ara Newby is a 67 year old female , right -hand dominant, who presents for followup evaluation and management of a left wrist injury. Returns today in cast, doing well. No much pain. No concerns. Pain today 0/10.    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  Seen in Urgent Care 1/2/2023, with xrays of the forearm obtained noting a nondisplaced distal radius and ulna fracture. Had worsened pain due to the splint not fitting well, so presented to [facility] ED on 1/14/2023, with repeat xrays showing stable fracture. Resplinted with improved pain.    It has been 7 weeks since the initial injury.      She is diabetic, last A1c=10.6  in our system 6/23/2021.          Past medical history:  has a past medical history of Diabetes mellitus, type 2 (H) and Hypertension.   There are no problems to display for this patient.    Past surgical history:  has no past surgical history on file.     Medications:    Current Outpatient Medications   Medication Sig Dispense Refill     acetaminophen (TYLENOL) 325 MG tablet        amLODIPine (NORVASC) 10 MG tablet TAKE ONE TABLET BY MOUTH ONCE DAILY 90 tablet 1     blood glucose (NO BRAND SPECIFIED) test strip Use to test blood sugar 3  "times daily or as directed. 300 strip 0     glipiZIDE (GLUCOTROL XL) 10 MG 24 hr tablet TAKE ONE TABLET BY MOUTH TWICE A DAY 90 tablet 0     ibuprofen (ADVIL/MOTRIN) 200 MG tablet Take 3 tablets (600 mg) by mouth every 6 hours as needed for mild pain 30 tablet 0     insulin aspart prot & aspart (NOVOLOG MIX 70/30 FLEXPEN) (70-30) 100 UNIT/ML pen INJECT 16 UNITS TWICE DAILY AS NEEDED FOR HIGH BLOOD SUGAR 45 mL 0     insulin pen needle (B-D U/F) 31G X 5 MM miscellaneous Use twice daily with Novolog pen 200 each 0     metFORMIN (GLUCOPHAGE) 1000 MG tablet Take 1 tablet (1,000 mg) by mouth 2 times daily (with meals) 360 tablet 1     metoprolol succinate ER (TOPROL-XL) 50 MG 24 hr tablet Take 1 tablet (50 mg) by mouth daily 90 tablet 1        Allergies:   No Known Allergies     Family History: family history is not on file.     Social History:  reports that she has never smoked. She has never used smokeless tobacco. She reports that she does not drink alcohol and does not use drugs.    Review of Systems:     Denies numbness, tingling, parasthesias.   Denies headaches.   Denies fevers, chills, night sweats   Denies chest pain.   Denies shortness of breath.   Denies any skin problems, abrasions, rashes, irritation.      Physical Exam  GENERAL APPEARANCE: healthy, alert, no distress.   SKIN: no suspicious lesions or rashes  NEURO: Normal strength and tone, mentation intact and speech normal  PSYCH:  mentation appears normal and affect normal. Not anxious.  RESPIRATORY: No increased work of breathing.    Ht 1.676 m (5' 6\")   Wt 80.7 kg (178 lb)   LMP  (LMP Unknown)   BMI 28.73 kg/m       left WRIST/HAND EXAM:    The castwas removed    Skin intact. Dry, flaking.  Normal wear pattern, color and tone.    There is mild swelling in the hand, fingers, wrist of the left upper extremity .  There is mild tenderness in the distal radius, distal ulna of the wrist.  There is no maceration of the skin.  There is no gross deformity in " the area.    Intact sensation light touch median, radial, ulnar nerves of the hand  Intact sensation to the radial and ulnar digital nerves of the fingers, as well as the finger tips.  Intact epl fpl fdp edc wrist flexion/extension biceps/triceps deltoid  Brisk capillary refill to all fingers.   Palpable radial pulse, 2+.    X-rays:  3 views left wrist from 2/16/2023 were reviewed in clinic today. On my review, stable alignment of previously noted impacted intra-articular distal radius fracture and slightly distracted ulnar styloid fracture. Fracture planes are less visible with increased sclerosis and signs of healing compared to previous images.. No new findings. Small lucency in the thumb metacarpal shaft measuring 6 mm, nonspecific but may reflect an enchondroma.     .    Assessment: 66yo RHD female with nondisplaced, impacted left distal radius fracture status post fall..    Plan:  Nonop fracture management    * exam, images reviewed  * fracture healing well, not yet fully healed.  * will take 8-12 weeks to heal  * will immbolize in removable wrist brace today.  * wear brace day and night, off at rest, hygiene, range of motion exercises (demonstrated today)  * wrist, finger range of motion.  * elevation.  * non weight bearing.    * return to clinic 4 weeks, xrays left wrist out of brace,  sooner if needed.      * All questions were addressed and answered prior to discharge from clinic today. The patient acknowledges an understanding of and agreement with the plan set forth during today's visit. Patient was advised to call our office or MyChart us if any further questions arise upon leaving our office today.        Kaushik Zhou M.D., M.S.  Dept. of Orthopaedic Surgery  [facility]      Procedures

## 2023-02-16 ENCOUNTER — OFFICE VISIT (OUTPATIENT)
Dept: ORTHOPEDICS | Facility: CLINIC | Age: 68
End: 2023-02-16
Payer: COMMERCIAL

## 2023-02-16 ENCOUNTER — ANCILLARY PROCEDURE (OUTPATIENT)
Dept: GENERAL RADIOLOGY | Facility: CLINIC | Age: 68
End: 2023-02-16
Attending: PHYSICIAN ASSISTANT
Payer: OTHER MISCELLANEOUS

## 2023-02-16 VITALS — HEIGHT: 66 IN | BODY MASS INDEX: 28.61 KG/M2 | WEIGHT: 178 LBS

## 2023-02-16 DIAGNOSIS — S52.602D CLOSED FRACTURE OF DISTAL ENDS OF LEFT RADIUS AND ULNA WITH ROUTINE HEALING, SUBSEQUENT ENCOUNTER: ICD-10-CM

## 2023-02-16 DIAGNOSIS — S52.502D CLOSED FRACTURE OF DISTAL ENDS OF LEFT RADIUS AND ULNA WITH ROUTINE HEALING, SUBSEQUENT ENCOUNTER: ICD-10-CM

## 2023-02-16 DIAGNOSIS — S52.502D CLOSED FRACTURE OF DISTAL ENDS OF LEFT RADIUS AND ULNA WITH ROUTINE HEALING, SUBSEQUENT ENCOUNTER: Primary | ICD-10-CM

## 2023-02-16 DIAGNOSIS — S52.602D CLOSED FRACTURE OF DISTAL ENDS OF LEFT RADIUS AND ULNA WITH ROUTINE HEALING, SUBSEQUENT ENCOUNTER: Primary | ICD-10-CM

## 2023-02-16 PROCEDURE — 99207 PR FRACTURE CARE IN GLOBAL PERIOD: CPT | Performed by: ORTHOPAEDIC SURGERY

## 2023-02-16 PROCEDURE — 73110 X-RAY EXAM OF WRIST: CPT | Mod: TC | Performed by: RADIOLOGY

## 2023-02-16 ASSESSMENT — PAIN SCALES - GENERAL: PAINLEVEL: NO PAIN (0)

## 2023-02-16 NOTE — LETTER
2/16/2023         RE: Ara Newby  [address]  [place] [address]        Dear Colleague,    Thank you for referring your patient, Ara Newby, to the [facility]. Please see a copy of my visit note below.    Chief Complaint:   Chief Complaint   Patient presents with     Left Wrist - Follow Up     WORK COMP. Left wrist fracture. DOI: 12/28/22. Presents in well fitting, clean, dry cast. Reports doing well with no complaints or pain. Utilizing iPad interpretor services.      INJURY: nondisplaced left distal radius fracture  DATE of INJURY: 12/28/2022    Today's encounter utilized a language specific  via iPad to obtain the HPI, PAST MEDICAL/SURGICAL history, social history, family history, medications and allergies and review of systems; in addition to perform physical examination; review pertinent imaging studies; discuss exam findings and assessment; and go over treatment plan.        HPI: Ara Newby is a 67 year old female , right -hand dominant, who presents for followup evaluation and management of a left wrist injury. Returns today in cast, doing well. No much pain. No concerns. Pain today 0/10.    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  Seen in Urgent Care 1/2/2023, with xrays of the forearm obtained noting a nondisplaced distal radius and ulna fracture. Had worsened pain due to the splint not fitting well, so presented to [facility] ED on 1/14/2023, with repeat xrays showing stable fracture. Resplinted with improved pain.    It has been 7 weeks since the initial injury.      She is diabetic, last A1c=10.6  in our system 6/23/2021.          Past medical history:  has a past medical history of Diabetes mellitus, type 2 (H) and Hypertension.   There are no problems to display for this patient.    Past surgical history:  has no past surgical history on file.     Medications:    Current  "Outpatient Medications   Medication Sig Dispense Refill     acetaminophen (TYLENOL) 325 MG tablet        amLODIPine (NORVASC) 10 MG tablet TAKE ONE TABLET BY MOUTH ONCE DAILY 90 tablet 1     blood glucose (NO BRAND SPECIFIED) test strip Use to test blood sugar 3 times daily or as directed. 300 strip 0     glipiZIDE (GLUCOTROL XL) 10 MG 24 hr tablet TAKE ONE TABLET BY MOUTH TWICE A DAY 90 tablet 0     ibuprofen (ADVIL/MOTRIN) 200 MG tablet Take 3 tablets (600 mg) by mouth every 6 hours as needed for mild pain 30 tablet 0     insulin aspart prot & aspart (NOVOLOG MIX 70/30 FLEXPEN) (70-30) 100 UNIT/ML pen INJECT 16 UNITS TWICE DAILY AS NEEDED FOR HIGH BLOOD SUGAR 45 mL 0     insulin pen needle (B-D U/F) 31G X 5 MM miscellaneous Use twice daily with Novolog pen 200 each 0     metFORMIN (GLUCOPHAGE) 1000 MG tablet Take 1 tablet (1,000 mg) by mouth 2 times daily (with meals) 360 tablet 1     metoprolol succinate ER (TOPROL-XL) 50 MG 24 hr tablet Take 1 tablet (50 mg) by mouth daily 90 tablet 1        Allergies:   No Known Allergies     Family History: family history is not on file.     Social History:  reports that she has never smoked. She has never used smokeless tobacco. She reports that she does not drink alcohol and does not use drugs.    Review of Systems:     Denies numbness, tingling, parasthesias.   Denies headaches.   Denies fevers, chills, night sweats   Denies chest pain.   Denies shortness of breath.   Denies any skin problems, abrasions, rashes, irritation.      Physical Exam  GENERAL APPEARANCE: healthy, alert, no distress.   SKIN: no suspicious lesions or rashes  NEURO: Normal strength and tone, mentation intact and speech normal  PSYCH:  mentation appears normal and affect normal. Not anxious.  RESPIRATORY: No increased work of breathing.    Ht 1.676 m (5' 6\")   Wt 80.7 kg (178 lb)   LMP  (LMP Unknown)   BMI 28.73 kg/m       left WRIST/HAND EXAM:    The castwas removed    Skin intact. Dry, " flaking.  Normal wear pattern, color and tone.    There is mild swelling in the hand, fingers, wrist of the left upper extremity .  There is mild tenderness in the distal radius, distal ulna of the wrist.  There is no maceration of the skin.  There is no gross deformity in the area.    Intact sensation light touch median, radial, ulnar nerves of the hand  Intact sensation to the radial and ulnar digital nerves of the fingers, as well as the finger tips.  Intact epl fpl fdp edc wrist flexion/extension biceps/triceps deltoid  Brisk capillary refill to all fingers.   Palpable radial pulse, 2+.    X-rays:  3 views left wrist from 2/16/2023 were reviewed in clinic today. On my review, stable alignment of previously noted impacted intra-articular distal radius fracture and slightly distracted ulnar styloid fracture. Fracture planes are less visible with increased sclerosis and signs of healing compared to previous images.. No new findings. Small lucency in the thumb metacarpal shaft measuring 6 mm, nonspecific but may reflect an enchondroma.     .    Assessment: 68yo RHD female with nondisplaced, impacted left distal radius fracture status post fall..    Plan:  Nonop fracture management    * exam, images reviewed  * fracture healing well, not yet fully healed.  * will take 8-12 weeks to heal  * will immbolize in removable wrist brace today.  * wear brace day and night, off at rest, hygiene, range of motion exercises (demonstrated today)  * wrist, finger range of motion.  * elevation.  * non weight bearing.    * return to clinic 4 weeks, xrays left wrist out of brace,  sooner if needed.      * All questions were addressed and answered prior to discharge from clinic today. The patient acknowledges an understanding of and agreement with the plan set forth during today's visit. Patient was advised to call our office or MyChart us if any further questions arise upon leaving our office today.        Kaushik Zhou M.D.,  M.S.  Dept. of Orthopaedic Surgery  [facility]      Procedures        Again, thank you for allowing me to participate in the care of your patient.        Sincerely,        Kaushik Zhou MD

## 2023-02-16 NOTE — LETTER
February 16, 2023      Ara Newby  [address] [address]        To Whom It May Concern,     Ara Newby attended clinic here on Feb 16, 2023 and she can return to work starting on 2/17/2023 with restrictions: no use of left upper extremity. No lifting, pushing, pulling.   Return to Clinic in 4 weeks    If you have questions or concerns, please call the clinic at the number listed above.    Sincerely,         Devon Hernandez PA-C, CAQ-OS  Dept. of Orthopedic Surgery  [facility]

## 2023-02-24 ENCOUNTER — OFFICE VISIT (OUTPATIENT)
Dept: ORTHOPEDICS | Facility: CLINIC | Age: 68
End: 2023-02-24
Payer: OTHER MISCELLANEOUS

## 2023-02-24 ENCOUNTER — ANCILLARY PROCEDURE (OUTPATIENT)
Dept: GENERAL RADIOLOGY | Facility: CLINIC | Age: 68
End: 2023-02-24
Attending: PEDIATRICS
Payer: OTHER MISCELLANEOUS

## 2023-02-24 VITALS — WEIGHT: 178 LBS | DIASTOLIC BLOOD PRESSURE: 80 MMHG | SYSTOLIC BLOOD PRESSURE: 145 MMHG | BODY MASS INDEX: 28.73 KG/M2

## 2023-02-24 DIAGNOSIS — S52.502D CLOSED FRACTURE DISTAL RADIUS AND ULNA, LEFT, WITH ROUTINE HEALING, SUBSEQUENT ENCOUNTER: ICD-10-CM

## 2023-02-24 DIAGNOSIS — S52.602D CLOSED FRACTURE DISTAL RADIUS AND ULNA, LEFT, WITH ROUTINE HEALING, SUBSEQUENT ENCOUNTER: ICD-10-CM

## 2023-02-24 DIAGNOSIS — S49.92XA ARM INJURY, LEFT, INITIAL ENCOUNTER: Primary | ICD-10-CM

## 2023-02-24 DIAGNOSIS — S49.92XA ARM INJURY, LEFT, INITIAL ENCOUNTER: ICD-10-CM

## 2023-02-24 PROCEDURE — 99203 OFFICE O/P NEW LOW 30 MIN: CPT | Mod: 24 | Performed by: PEDIATRICS

## 2023-02-24 PROCEDURE — 73110 X-RAY EXAM OF WRIST: CPT | Mod: TC | Performed by: RADIOLOGY

## 2023-02-24 PROCEDURE — 99207 PR FRACTURE CARE IN GLOBAL PERIOD: CPT | Performed by: PEDIATRICS

## 2023-02-24 NOTE — LETTER
[facility]  [address]  [place] [address]  Phone: [phone]  Fax: [phone]      February 24, 2023      RE: Ara Newby  [address] [address]        To whom it may concern:    Aravenessa Newby was seen in clinic today.    When the patient returns to work, continue with previously placed work restrictions from Dr Kaushik Zhou.      Sincerely,            Ramin Oliva DO, CAQ

## 2023-02-24 NOTE — Clinical Note
THOMAS Ara was struck in the left elbow area at work, and had increased pain in the left wrist. She'll follow up with you as planned.

## 2023-02-24 NOTE — PATIENT INSTRUCTIONS
Left arm injury consistent with contusion.  X-rays of the left wrist today demonstrate fracture is stable, and healing.  Continue with brace as you have been doing.  Continue with current work restrictions; provided a letter today.  Plan to follow-up with Dr Zhou as already scheduled for 3/22/2023.    If you have any further questions for your physician or physician s care team you can contact them thru [employer] or by calling  [phone] and use option 3 to leave a voice message.   Messages received during business hours will be returned same day.

## 2023-02-24 NOTE — PROGRESS NOTES
ASSESSMENT & PLAN    Ara was seen today for follow up.    Diagnoses and all orders for this visit:    Arm injury, left, initial encounter  -     XR Wrist Left G/E 3 Views; Future    Closed fracture distal radius and ulna, left, with routine healing, subsequent encounter  -     XR Wrist Left G/E 3 Views; Future      Contusion of left UE by history.  Her primary concern was about increased pain at level of fracture, whether fracture was worsened by left UE injury. Fracture is stable on x-ray, which iss reassuring to her.  Continue with brace and current cares, and follow-up with Dr Zhou as planned.  Questions answered. Discussed signs and symptoms that may indicate more serious issues; the patient was instructed to seek appropriate care if noted. Ara indicates understanding of these issues and agrees with the plan.        See Patient Instructions  Patient Instructions   Left arm injury consistent with contusion.  X-rays of the left wrist today demonstrate fracture is stable, and healing.  Continue with brace as you have been doing.  Continue with current work restrictions; provided a letter today.  Plan to follow-up with Dr Zhou as already scheduled for 3/22/2023.    If you have any further questions for your physician or physician s care team you can contact them thru MyChart or by calling  [phone] and use option 3 to leave a voice message.   Messages received during business hours will be returned same day.          Ramin Oliva, [facility]    -----  Chief Complaint   Patient presents with     Left Wrist - Follow Up       SUBJECTIVE  Ara LUCILLE Newby is a/an 67 year old female who is seen in follow-up for evaluation of left wrist.     The patient is seen by themselves but visit conducted with  by phone.  The patient is Left handed    Onset: 1 day(s) ago. Patient describes injury as patient was at work helping patient and was  hit in the elbow   Location of Pain: left wrist pain but does radiate up to upper arm  Worsened by: constant pain unable to sleep on left side  Better with: when wearing a sling  Treatments tried: casting/splinting/bracing  Associated symptoms: swelling and tingling    Orthopedic/Surgical history: NO  Social History/Occupation: light duty, working in assistive lifting homing     **  Struck in left posterior elbow by resident's elbow.  Sharp pain into left forearm.  Has pain in left wrist where has had left writ fracture. Pain from left wrist, into forearm, to left elbow.  Swelling in left wrist.  Pain can radiate to left axilla.  Had dull pain in left wrist prior to this injury, responded well to OTC medication. Now pain increased in wrist again.    This injury is work comp.      REVIEW OF SYSTEMS:  Review of Systems      OBJECTIVE:  BP (!) 145/80   Wt 80.7 kg (178 lb)   LMP  (LMP Unknown)   BMI 28.73 kg/m     General: healthy, alert and in no distress  HEENT: no scleral icterus or conjunctival erythema  Skin: no suspicious lesions or rash. No jaundice.  CV: distal perfusion intact   Resp: normal respiratory effort without conversational dyspnea   Psych: normal mood and affect  Gait: NORMAL  Neuro: Normal light sensory exam of extremity         Left Elbow exam:    Inspection:     no ecchymosis       no edema or effusion    ROM:     full with flexion, extension, forearm supination and pronation.    Strength: deferred with wrist pain    Tender: none focal            Hand/wrist (left):    Inspection:  No deformity noted.  Mild dorsal/radial swelling. No ecchymosis.    Motion:  Forearm pronation full, forearm supination full.  Wrist flexion mod limitation, stiffness, some pain  Wrist extension mod limitation, stiffness, some pain  Digit motion mild-mod limitation, stiffness    Strength:  deferred    Sensation:  Grossly intact light touch.    Radial pulses normal, +2/4, capillary refill brisk.    Palpation:  Tender  wrist, more dorsal aspect      RADIOLOGY:  I independently ordered, visualized and reviewed these images with the patient  Stable, healing distal radius fracture, with stable ulnar styloid fracture.    Recent Results (from the past 24 hour(s))   XR Wrist Left G/E 3 Views    Narrative    EXAM: XR WRIST LEFT G/E 3 VIEWS  DATE/TIME: 2/24/2023 11:32 AM     INDICATION: Left distal radius fracture. Interval follow-up.   COMPARISON: 2/16/2023.      Impression    IMPRESSION:  1.  Healing fracture of the left distal radius. No change in alignment  or orientation. Bridging bone and sclerosis at the fracture margins,  unchanged.  2.  Unhealed ulnar styloid process fracture, stable.  3.  Mild bone demineralization, unchanged.  4.  Normal joint alignment.    ASHLEY DELAROSA MD         SYSTEM ID:  [id number]       Most recent x-ray visualized/reviewed:    XR WRIST LEFT G/E 3 VIEWS 2/16/2023 2:09 PM      HISTORY: Closed fracture of distal ends of left radius and ulna with  routine healing, subsequent encounter; Closed fracture of distal ends  of left radius and ulna with routine healing, subsequent encounter     COMPARISON: 1/14/2023                                                                          IMPRESSION: Progression of bony remodeling and healing of a fracture  of the distal radius seen on the old study. No change in alignment.  Old fracture of the ulnar styloid. No new fractures are identified.  Normal carpal alignment.     KRYSTA MOSES MD         Review of prior external note(s) from - ortho surgery  Review of the result(s) of each unique test - imaging  Independent interpretation of a test performed by another physician/other qualified health care professional (not separately reported) - imaging  Ordering of each unique test

## 2023-02-24 NOTE — LETTER
2/24/2023         RE: Ara Newby  [address]  [place] [address]        Dear Colleague,    Thank you for referring your patient, Ara Newby, to the [facility]. Please see a copy of my visit note below.    ASSESSMENT & PLAN    Ara was seen today for follow up.    Diagnoses and all orders for this visit:    Arm injury, left, initial encounter  -     XR Wrist Left G/E 3 Views; Future    Closed fracture distal radius and ulna, left, with routine healing, subsequent encounter  -     XR Wrist Left G/E 3 Views; Future      Contusion of left UE by history.  Her primary concern was about increased pain at level of fracture, whether fracture was worsened by left UE injury. Fracture is stable on x-ray, which iss reassuring to her.  Continue with brace and current cares, and follow-up with Dr Zhou as planned.  Questions answered. Discussed signs and symptoms that may indicate more serious issues; the patient was instructed to seek appropriate care if noted. Ara indicates understanding of these issues and agrees with the plan.        See Patient Instructions  Patient Instructions   Left arm injury consistent with contusion.  X-rays of the left wrist today demonstrate fracture is stable, and healing.  Continue with brace as you have been doing.  Continue with current work restrictions; provided a letter today.  Plan to follow-up with Dr Zhou as already scheduled for 3/22/2023.    If you have any further questions for your physician or physician s care team you can contact them thru MyChart or by calling  [phone] and use option 3 to leave a voice message.   Messages received during business hours will be returned same day.          Ramin Oliva,   [facility]    -----  Chief Complaint   Patient presents with     Left Wrist - Follow Up       SUBJECTIVE  Ara Newby is a/an 67 year old female who  is seen in follow-up for evaluation of left wrist.     The patient is seen by themselves but visit conducted with  by phone.  The patient is Left handed    Onset: 1 day(s) ago. Patient describes injury as patient was at work helping patient and was hit in the elbow   Location of Pain: left wrist pain but does radiate up to upper arm  Worsened by: constant pain unable to sleep on left side  Better with: when wearing a sling  Treatments tried: casting/splinting/bracing  Associated symptoms: swelling and tingling    Orthopedic/Surgical history: NO  Social History/Occupation: light duty, working in assistive lifting homing     **  Struck in left posterior elbow by resident's elbow.  Sharp pain into left forearm.  Has pain in left wrist where has had left writ fracture. Pain from left wrist, into forearm, to left elbow.  Swelling in left wrist.  Pain can radiate to left axilla.  Had dull pain in left wrist prior to this injury, responded well to OTC medication. Now pain increased in wrist again.    This injury is work comp.      REVIEW OF SYSTEMS:  Review of Systems      OBJECTIVE:  BP (!) 145/80   Wt 80.7 kg (178 lb)   LMP  (LMP Unknown)   BMI 28.73 kg/m     General: healthy, alert and in no distress  HEENT: no scleral icterus or conjunctival erythema  Skin: no suspicious lesions or rash. No jaundice.  CV: distal perfusion intact   Resp: normal respiratory effort without conversational dyspnea   Psych: normal mood and affect  Gait: NORMAL  Neuro: Normal light sensory exam of extremity         Left Elbow exam:    Inspection:     no ecchymosis       no edema or effusion    ROM:     full with flexion, extension, forearm supination and pronation.    Strength: deferred with wrist pain    Tender: none focal            Hand/wrist (left):    Inspection:  No deformity noted.  Mild dorsal/radial swelling. No ecchymosis.    Motion:  Forearm pronation full, forearm supination full.  Wrist flexion mod  limitation, stiffness, some pain  Wrist extension mod limitation, stiffness, some pain  Digit motion mild-mod limitation, stiffness    Strength:  deferred    Sensation:  Grossly intact light touch.    Radial pulses normal, +2/4, capillary refill brisk.    Palpation:  Tender wrist, more dorsal aspect      RADIOLOGY:  I independently ordered, visualized and reviewed these images with the patient  Stable, healing distal radius fracture, with stable ulnar styloid fracture.    Recent Results (from the past 24 hour(s))   XR Wrist Left G/E 3 Views    Narrative    EXAM: XR WRIST LEFT G/E 3 VIEWS  DATE/TIME: 2/24/2023 11:32 AM     INDICATION: Left distal radius fracture. Interval follow-up.   COMPARISON: 2/16/2023.      Impression    IMPRESSION:  1.  Healing fracture of the left distal radius. No change in alignment  or orientation. Bridging bone and sclerosis at the fracture margins,  unchanged.  2.  Unhealed ulnar styloid process fracture, stable.  3.  Mild bone demineralization, unchanged.  4.  Normal joint alignment.    ASHLEY DELAROSA MD         SYSTEM ID:  [id number]       Most recent x-ray visualized/reviewed:    XR WRIST LEFT G/E 3 VIEWS 2/16/2023 2:09 PM      HISTORY: Closed fracture of distal ends of left radius and ulna with  routine healing, subsequent encounter; Closed fracture of distal ends  of left radius and ulna with routine healing, subsequent encounter     COMPARISON: 1/14/2023                                                                          IMPRESSION: Progression of bony remodeling and healing of a fracture  of the distal radius seen on the old study. No change in alignment.  Old fracture of the ulnar styloid. No new fractures are identified.  Normal carpal alignment.     KRYSTA MOSES MD         Review of prior external note(s) from - ortho surgery  Review of the result(s) of each unique test - imaging  Independent interpretation of a test performed by another physician/other qualified health  care professional (not separately reported) - imaging  Ordering of each unique test           Again, thank you for allowing me to participate in the care of your patient.        Sincerely,        Ramin Oliva, DO

## 2023-03-05 NOTE — PROGRESS NOTES
Chief Complaint:   Chief Complaint   Patient presents with     Left Wrist - Follow Up     Work Comp. Left wrist fracture. DOI: 12/28/22. 12 weeks from injury. Presents in wrist brace, doing well. Continuing to improve. cold and numbness in all digits occasionally, only at night      INJURY: nondisplaced left distal radius fracture  DATE of INJURY: 12/28/2022    Today's encounter utilized a language specific  via iPad to obtain the HPI, PAST MEDICAL/SURGICAL history, social history, family history, medications and allergies and review of systems; in addition to perform physical examination; review pertinent imaging studies; discuss exam findings and assessment; and go over treatment plan.        HPI: Ara Newby is a 67 year old female , right -hand dominant, who presents for followup evaluation and management of a left wrist injury. Returns today in brace, doing well. No much pain. No concerns. Pain today 0/10.    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  Seen in Urgent Care 1/2/2023, with xrays of the forearm obtained noting a nondisplaced distal radius and ulna fracture. Had worsened pain due to the splint not fitting well, so presented to [facility] ED on 1/14/2023, with repeat xrays showing stable fracture. Resplinted with improved pain.    It has been 12 weeks since the initial injury.      She is diabetic, last A1c=10.6  in our system 6/23/2021.          Past medical history:  has a past medical history of Diabetes mellitus, type 2 (H) and Hypertension.   There are no problems to display for this patient.    Past surgical history:  has no past surgical history on file.     Medications:    Current Outpatient Medications   Medication Sig Dispense Refill     acetaminophen (TYLENOL) 325 MG tablet        amLODIPine (NORVASC) 10 MG tablet TAKE ONE TABLET BY MOUTH ONCE DAILY 90 tablet 1     blood glucose (NO BRAND SPECIFIED) test strip Use to test  "blood sugar 3 times daily or as directed. 300 strip 0     glipiZIDE (GLUCOTROL XL) 10 MG 24 hr tablet TAKE ONE TABLET BY MOUTH TWICE A DAY 90 tablet 0     ibuprofen (ADVIL/MOTRIN) 200 MG tablet Take 3 tablets (600 mg) by mouth every 6 hours as needed for mild pain 30 tablet 0     insulin aspart prot & aspart (NOVOLOG MIX 70/30 FLEXPEN) (70-30) 100 UNIT/ML pen INJECT 16 UNITS TWICE DAILY AS NEEDED FOR HIGH BLOOD SUGAR 45 mL 0     insulin pen needle (B-D U/F) 31G X 5 MM miscellaneous Use twice daily with Novolog pen 200 each 0     metFORMIN (GLUCOPHAGE) 1000 MG tablet Take 1 tablet (1,000 mg) by mouth 2 times daily (with meals) 360 tablet 1     metoprolol succinate ER (TOPROL-XL) 50 MG 24 hr tablet Take 1 tablet (50 mg) by mouth daily 90 tablet 1        Allergies:   No Known Allergies     Family History: family history is not on file.     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 She has never used smokeless tobacco. She reports that she does not drink alcohol and does not use drugs.    Review of Systems:     Denies numbness, tingling, parasthesias.   Denies headaches.   Denies fevers, chills, night sweats   Denies chest pain.   Denies shortness of breath.   Denies any skin problems, abrasions, rashes, irritation.      Physical Exam  GENERAL APPEARANCE: healthy, alert, no distress. ( via iPad)  SKIN: no suspicious lesions or rashes  NEURO: Normal strength and tone, mentation intact and speech normal  PSYCH:  mentation appears normal and affect normal. Not anxious.  RESPIRATORY: No increased work of breathing.    Ht 1.676 m (5' 6\")   Wt 80.7 kg (178 lb)   LMP  (LMP Unknown)   BMI 28.73 kg/m       left WRIST/HAND EXAM:    Skin intact.   Normal wear pattern, color and tone.    There is no swelling in the hand, fingers, wrist of the left upper extremity .  There is mild tenderness in the distal radius, distal ulna of the wrist.  There is no maceration of " the skin.  There is no gross deformity in the area.  Wrist range of motion: decreased.    Intact sensation light touch median, radial, ulnar nerves of the hand  Intact sensation to the radial and ulnar digital nerves of the fingers, as well as the finger tips.  Intact epl fpl fdp edc wrist flexion/extension biceps/triceps deltoid  Brisk capillary refill to all fingers.   Palpable radial pulse, 2+.    X-rays:  3 views left wrist from 3/22/2023 were reviewed in clinic today. On my review, stable alignment of previously noted impacted intra-articular distal radius fracture and slightly distracted ulnar styloid fracture. Fracture planes are not visible with increased sclerosis and signs of further healing compared to previous images.. No new findings. Small lucency in the thumb metacarpal shaft measuring 6 mm, nonspecific but may reflect an enchondroma.     .    Assessment: 66yo RHD female with nondisplaced, impacted left distal radius fracture status post fall..    Plan:  Nonop fracture management    * exam, images reviewed; fracture essentially healed.  * discontinue brace immobilization, work on range of motion.  * hand therapy referral for range of motion, strengthening.    * wrist, finger range of motion.  * elevation.  * light weight bearing, 2lbs.  * workability: light duty. 2lbs restriction.    * return to clinic 4 weeks, xrays left wrist, sooner if needed.      * All questions were addressed and answered prior to discharge from clinic today. The patient acknowledges an understanding of and agreement with the plan set forth during today's visit. Patient was advised to call our office or MyChart us if any further questions arise upon leaving our office today.        Kaushik Zhou M.D., M.S.  Dept. of Orthopaedic Surgery  [facility]

## 2023-03-22 ENCOUNTER — ANCILLARY PROCEDURE (OUTPATIENT)
Dept: GENERAL RADIOLOGY | Facility: CLINIC | Age: 68
End: 2023-03-22
Attending: PHYSICIAN ASSISTANT
Payer: OTHER MISCELLANEOUS

## 2023-03-22 ENCOUNTER — OFFICE VISIT (OUTPATIENT)
Dept: ORTHOPEDICS | Facility: CLINIC | Age: 68
End: 2023-03-22
Payer: OTHER MISCELLANEOUS

## 2023-03-22 VITALS — WEIGHT: 178 LBS | HEIGHT: 66 IN | BODY MASS INDEX: 28.61 KG/M2

## 2023-03-22 DIAGNOSIS — S52.502D CLOSED FRACTURE OF DISTAL ENDS OF LEFT RADIUS AND ULNA WITH ROUTINE HEALING: ICD-10-CM

## 2023-03-22 DIAGNOSIS — S52.502D CLOSED FRACTURE OF DISTAL ENDS OF LEFT RADIUS AND ULNA WITH ROUTINE HEALING: Primary | ICD-10-CM

## 2023-03-22 DIAGNOSIS — S52.602D CLOSED FRACTURE OF DISTAL ENDS OF LEFT RADIUS AND ULNA WITH ROUTINE HEALING: ICD-10-CM

## 2023-03-22 DIAGNOSIS — S52.602D CLOSED FRACTURE OF DISTAL ENDS OF LEFT RADIUS AND ULNA WITH ROUTINE HEALING: Primary | ICD-10-CM

## 2023-03-22 PROCEDURE — 73110 X-RAY EXAM OF WRIST: CPT | Mod: TC | Performed by: RADIOLOGY

## 2023-03-22 PROCEDURE — 99207 PR FRACTURE CARE IN GLOBAL PERIOD: CPT | Performed by: ORTHOPAEDIC SURGERY

## 2023-03-22 ASSESSMENT — PAIN SCALES - GENERAL: PAINLEVEL: MILD PAIN (2)

## 2023-03-22 NOTE — LETTER
3/22/2023         RE: Ara Newby  [address] [facility]  [place] [address]        Dear Colleague,    Thank you for referring your patient, Ara Newby, to the [facility]. Please see a copy of my visit note below.    Chief Complaint:   Chief Complaint   Patient presents with     Left Wrist - Follow Up     Work Comp. Left wrist fracture. DOI: 12/28/22. 12 weeks from injury. Presents in wrist brace, doing well. Continuing to improve. cold and numbness in all digits occasionally, only at night      INJURY: nondisplaced left distal radius fracture  DATE of INJURY: 12/28/2022    Today's encounter utilized a language specific  via iPad to obtain the HPI, PAST MEDICAL/SURGICAL history, social history, family history, medications and allergies and review of systems; in addition to perform physical examination; review pertinent imaging studies; discuss exam findings and assessment; and go over treatment plan.        HPI: Ara Newby is a 67 year old female , right -hand dominant, who presents for followup evaluation and management of a left wrist injury. Returns today in brace, doing well. No much pain. No concerns. Pain today 0/10.    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  Seen in Urgent Care 1/2/2023, with xrays of the forearm obtained noting a nondisplaced distal radius and ulna fracture. Had worsened pain due to the splint not fitting well, so presented to [facility] ED on 1/14/2023, with repeat xrays showing stable fracture. Resplinted with improved pain.    It has been 12 weeks since the initial injury.      She is diabetic, last A1c=10.6  in our system 6/23/2021.          Past medical history:  has a past medical history of Diabetes mellitus, type 2 (H) and Hypertension.   There are no problems to display for this patient.    Past surgical history:  has no past surgical history on file.  "    Medications:    Current Outpatient Medications   Medication Sig Dispense Refill     acetaminophen (TYLENOL) 325 MG tablet        amLODIPine (NORVASC) 10 MG tablet TAKE ONE TABLET BY MOUTH ONCE DAILY 90 tablet 1     blood glucose (NO BRAND SPECIFIED) test strip Use to test blood sugar 3 times daily or as directed. 300 strip 0     glipiZIDE (GLUCOTROL XL) 10 MG 24 hr tablet TAKE ONE TABLET BY MOUTH TWICE A DAY 90 tablet 0     ibuprofen (ADVIL/MOTRIN) 200 MG tablet Take 3 tablets (600 mg) by mouth every 6 hours as needed for mild pain 30 tablet 0     insulin aspart prot & aspart (NOVOLOG MIX 70/30 FLEXPEN) (70-30) 100 UNIT/ML pen INJECT 16 UNITS TWICE DAILY AS NEEDED FOR HIGH BLOOD SUGAR 45 mL 0     insulin pen needle (B-D U/F) 31G X 5 MM miscellaneous Use twice daily with Novolog pen 200 each 0     metFORMIN (GLUCOPHAGE) 1000 MG tablet Take 1 tablet (1,000 mg) by mouth 2 times daily (with meals) 360 tablet 1     metoprolol succinate ER (TOPROL-XL) 50 MG 24 hr tablet Take 1 tablet (50 mg) by mouth daily 90 tablet 1        Allergies:   No Known Allergies     Family History: family history is not on file.     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 She has never used smokeless tobacco. She reports that she does not drink alcohol and does not use drugs.    Review of Systems:     Denies numbness, tingling, parasthesias.   Denies headaches.   Denies fevers, chills, night sweats   Denies chest pain.   Denies shortness of breath.   Denies any skin problems, abrasions, rashes, irritation.      Physical Exam  GENERAL APPEARANCE: healthy, alert, no distress. ( via iPad)  SKIN: no suspicious lesions or rashes  NEURO: Normal strength and tone, mentation intact and speech normal  PSYCH:  mentation appears normal and affect normal. Not anxious.  RESPIRATORY: No increased work of breathing.    Ht 1.676 m (5' 6\")   Wt 80.7 kg (178 lb)   LMP  (LMP Unknown)   BMI " 28.73 kg/m       left WRIST/HAND EXAM:    Skin intact.   Normal wear pattern, color and tone.    There is no swelling in the hand, fingers, wrist of the left upper extremity .  There is mild tenderness in the distal radius, distal ulna of the wrist.  There is no maceration of the skin.  There is no gross deformity in the area.  Wrist range of motion: decreased.    Intact sensation light touch median, radial, ulnar nerves of the hand  Intact sensation to the radial and ulnar digital nerves of the fingers, as well as the finger tips.  Intact epl fpl fdp edc wrist flexion/extension biceps/triceps deltoid  Brisk capillary refill to all fingers.   Palpable radial pulse, 2+.    X-rays:  3 views left wrist from 3/22/2023 were reviewed in clinic today. On my review, stable alignment of previously noted impacted intra-articular distal radius fracture and slightly distracted ulnar styloid fracture. Fracture planes are not visible with increased sclerosis and signs of further healing compared to previous images.. No new findings. Small lucency in the thumb metacarpal shaft measuring 6 mm, nonspecific but may reflect an enchondroma.     .    Assessment: 68yo RHD female with nondisplaced, impacted left distal radius fracture status post fall..    Plan:  Nonop fracture management    * exam, images reviewed; fracture essentially healed.  * discontinue brace immobilization, work on range of motion.  * hand therapy referral for range of motion, strengthening.    * wrist, finger range of motion.  * elevation.  * light weight bearing, 2lbs.  * workability: light duty. 2lbs restriction.    * return to clinic 4 weeks, xrays left wrist, sooner if needed.      * All questions were addressed and answered prior to discharge from clinic today. The patient acknowledges an understanding of and agreement with the plan set forth during today's visit. Patient was advised to call our office or MyChart us if any further questions arise upon leaving  our office today.        Kaushik Zhou M.D., M.S.  Dept. of Orthopaedic Surgery  [facility]      Again, thank you for allowing me to participate in the care of your patient.        Sincerely,        Kaushik Zhou MD

## 2023-03-22 NOTE — LETTER
March 22, 2023      Ara Newby  [address] [facility] [address]        To Whom It May Concern,     Ara Newby attended clinic here on Mar 22, 2023 and she can return to work with restrictions: light duty only, 2lb lifting restrictions. No heavy lifting, pushing, pulling with left upper extremity.  Return to clinic in 4 weeks    If you have questions or concerns, please call the clinic at the number listed above.    Sincerely,         Devon Hernandez PA-C, CAQ-OS  Dept. of Orthopedic Surgery  [facility]

## 2023-03-27 ENCOUNTER — TELEPHONE (OUTPATIENT)
Dept: SURGERY | Facility: CLINIC | Age: 68
End: 2023-03-27
Payer: COMMERCIAL

## 2023-03-27 NOTE — TELEPHONE ENCOUNTER
[facility] Call Center    Phone Message    May a detailed message be left on voicemail: yes     Reason for Call: Other: Hello, Radha FROM  NEEDS THE RECORDS /OFFICE NOTES FROM 3/22/23 FAXED TO HER [phone]     Action Taken: Other: [facility]    Travel Screening: Not Applicable

## 2023-03-28 NOTE — TELEPHONE ENCOUNTER
Correct pharmacy verified with patient and confirmed in snapshot? [x] yes []no    Charge captured ? [x] yes  [] no    Medications Phoned  to Pharmacy [] yes [x]no  Name of Pharmacist:  List Medications, including dose, quantity and instructions      Medication Prescriptions given to patient   [] yes  [x] no   List the name of the drug the prescription was written for.       Medications ordered this visit were e-scribed.  Verified by order class [] yes  [x] no, no medications needing ordering.    Medication changes or discontinuations were communicated to patient's pharmacy: [] yes  [x] no, no medication discontinued.    UA collected [] yes  [x] no    [place] Prescription Monitoring Program Reviewed? [] yes  [x] no, no medication to report on the [place] .    Referrals were made to:  Liver enzymes, platelet count, wbc and Depakote level for monitoring Depakote     Future appointment was made: [x] yes  [] no    Dictation completed at time of chart check: [] yes  [x] no    I have checked the documentation for today s encounters and the above information has been reviewed and completed.       Faxed office notes as requested to the number provided. Fax confirmed.   Mary Wilson Certified Medical Assistant

## 2023-03-29 ENCOUNTER — THERAPY VISIT (OUTPATIENT)
Dept: OCCUPATIONAL THERAPY | Facility: CLINIC | Age: 68
End: 2023-03-29
Attending: PHYSICIAN ASSISTANT
Payer: COMMERCIAL

## 2023-03-29 DIAGNOSIS — M25.632 WRIST STIFFNESS, LEFT: Primary | ICD-10-CM

## 2023-03-29 DIAGNOSIS — S52.502D CLOSED FRACTURE OF DISTAL ENDS OF LEFT RADIUS AND ULNA WITH ROUTINE HEALING: ICD-10-CM

## 2023-03-29 DIAGNOSIS — S52.602D CLOSED FRACTURE OF DISTAL ENDS OF LEFT RADIUS AND ULNA WITH ROUTINE HEALING: ICD-10-CM

## 2023-03-29 PROCEDURE — 97110 THERAPEUTIC EXERCISES: CPT | Mod: GO | Performed by: OCCUPATIONAL THERAPIST

## 2023-03-29 PROCEDURE — 97165 OT EVAL LOW COMPLEX 30 MIN: CPT | Mod: GO | Performed by: OCCUPATIONAL THERAPIST

## 2023-03-29 NOTE — PROGRESS NOTES
[facility]    OUTPATIENT Occupational Therapy ORTHOPEDIC EVALUATION  PLAN OF TREATMENT FOR OUTPATIENT REHABILITATION  (COMPLETE FOR INITIAL CLAIMS ONLY)  Patient's Last Name, First Name, M.I.  YOB: 1955  Ara Newby    Provider s Name:  [facility]   Medical Record No.  [id number]   Start of Care Date:  03/29/23   Onset Date:   12/28/22   Treatment Diagnosis:  Left distal radius and ulna Fx Medical Diagnosis:     Closed fracture of distal ends of left radius and ulna with routine healing  Wrist stiffness, left       Goals:     03/29/23 0500   Goal #1   Goal #1 household chores   Previous Performance Level Independent   Current Functional Task    Current Performance Level severe difficulty   STG Target Perfomance Open a tight or new jar   STG Target Perform Level moderate difficulty   Due Date 05/12/23   LTG Target Task/Performance Other - on additional line   Other Household Chores mild difficulty   Due Date 06/26/23         Therapy Frequency:  1 x per week  Predicted Duration of Therapy Intervention:  for 2 months tapering to 2 X a month over 1 month    Chela Raymundo OT                 I CERTIFY THE NEED FOR THESE SERVICES FURNISHED UNDER        THIS PLAN OF TREATMENT AND WHILE UNDER MY CARE     (Physician attestation of this document indicates review and certification of the therapy plan).                     Certification Date From:  03/29/23   Certification Date To:  06/26/23    Referring Provider:  Devon Hernandez    Initial Assessment        See Epic Evaluation SOC Date: 03/29/23

## 2023-03-29 NOTE — PROGRESS NOTES
Hand Therapy Initial Evaluation    Current Date:  3/29/2023    Subjective:  Ara Newby is a 67 year old left hand dominant female.    Diagnosis:   Left distal radius and ulna fracture  DOI:  12/28/2022  Post:  13w 0d    Patient reports symptoms of pain, stiffness/loss of motion, weakness/loss of strength, edema and numbness of the left wrist which occurred due to FOOSH when slipped on ice. Since onset symptoms are gradually getting better.  Special tests:  x-ray.  Previous treatment: cast/splint.  General health as reported by patient is fair.  Pertinent medical history includes:History of Fractures  Medical allergies:none.  Surgical history: none.  Medication history: None.    Occupational Profile Information:  Current occupation is Caregiver  Currently working light duty  Job Tasks: Lifting, Carrying, Prolonged Standing, Pushing, Pulling, Repetitive Tasks  Prior functional level:  no limitations  Barriers include:none  Mobility: No difficulty  Transportation: drives    Objective:  Pain Level (Scale 0-10)   3/29/2023   At Rest 0   With Use 3     Pain Description  Date 3/29/2023   Location wrist   Pain Quality soreness   Frequency intermittent     Pain is worst  daytime   Exacerbated by  using hand   Relieved by rest   Progression Gradually improving     Sensation:  Intermittently in glove Like per pt report    Edema (Circumference measured in cm)   3/29/2023 3/29/2023    Right Left   DWC 15.7 16.2      ROM  Elbow and shoulder AROM WNL's. Finger flexion 0-1 to DPC. Pt very guarded with AROM of left hand and wrist.  Wrist 3/29/2023 3/29/2023   AROM (PROM) Right Left   Extension 60 40 (50)   Flexion 70 30 (35)   RD 20 0   UD 40 15   Supination 75 35 (45)   Pronation 80 75     Thumb 3/29/2023 3/29/2023   AROM  (PROM) Right Left   Kapandji Opposition Scale (0-10/10) 10/10 7/10     Strength   (Measured in pounds)  Pain Report: - none  + mild    ++ moderate    +++ severe    3/29/2023 3/29/2023   Trials Right  Left   1  2  3 30  36  24 0+  1 trial due to pain   Average 30 0     Lat Pinch 3/29/2023 3/29/2023   Trials Right Left   1  2  3 16  15  15 2+   1 trial due to pain   Average 15 2     3 Pt Pinch 3/29/2023 3/29/2023   Trials Right Left   1  2  3 9  11  9 2+  1 trial due to pain   Average 10 2     Assessment:  Patient presents with symptoms consistent with diagnosis of left wrist distal radius and ulna fracture, with conservative intervention.     Patient's limitations or Problem List includes:  Pain, Decreased ROM/motion, Increased edema, Weakness, Decreased  and Decreased pinch of the left wrist which interferes with the patient's ability to perform Work Tasks, Sleep Patterns and Household Chores as compared to previous level of function.    Rehab Potential:  Good - Return to full activity, some limitations    Patient will benefit from skilled Occupational Therapy to increase ROM, flexibility, overall strength,  strength and pinch strength and decrease pain and edema to return to previous activity level and resume normal daily tasks and to reach their rehab potential.    Barriers to Learning:  Language    Communication Issues:  Patient appears to be able to clearly communicate and understand verbal and written communication and follow directions correctly.  Patient has an  for communication clarity.    Chart Review: Chart Review and Simple history review with patient    Identified Performance Deficits: home establishment and management, meal preparation and cleanup, sleep and work    Assessment of Occupational Performance:  5 or more Performance Deficits    Clinical Decision Making (Complexity): Low complexity    Treatment Explanation:  The following has been discussed with the patient:  RX ordered/plan of care  Anticipated outcomes  Possible risks and side effects    Plan:  Frequency:  1 X week, once daily  Duration:  for 2 months tapering to 2 X a month over 1 month    Treatment Plan:     Modalities:    US and Paraffin   Therapeutic Exercise:    AROM, PROM, Tendon Gliding, Isotonics, Isometrics and Stabilization  Therapeutic Activities:   Functional activities   Neuromuscular re-ed:   Desensitization  Manual Techniques:   Joint mobilization and Myofascial release  Orthotic Fabrication:    Static progressive  Self Care:    Self Care Tasks    Discharge Plan:    Achieve all LTG.  Independent in home treatment program.  Reach maximal therapeutic benefit.    Home Program:   Warmth for stiffness  AROM fingers with 3 fist tendon gliding  AROM thumb E/F and opposition  AROM of wrist E/F, R/U and P/S   and pinch strengthening  Wean OTC wrist splint as able  Encourage functional use of left hand     Next Visit:  See in 1 week  Assess response to HEP  Progress to PROM for wrist E/F  Add to wrist isotonics as able  Add joint mobs to facilitate wrist ROM

## 2023-04-04 ENCOUNTER — THERAPY VISIT (OUTPATIENT)
Dept: OCCUPATIONAL THERAPY | Facility: CLINIC | Age: 68
End: 2023-04-04
Attending: PHYSICIAN ASSISTANT
Payer: COMMERCIAL

## 2023-04-04 DIAGNOSIS — S52.502D CLOSED FRACTURE OF DISTAL ENDS OF LEFT RADIUS AND ULNA WITH ROUTINE HEALING: ICD-10-CM

## 2023-04-04 DIAGNOSIS — M25.632 WRIST STIFFNESS, LEFT: Primary | ICD-10-CM

## 2023-04-04 DIAGNOSIS — S52.602D CLOSED FRACTURE OF DISTAL ENDS OF LEFT RADIUS AND ULNA WITH ROUTINE HEALING: ICD-10-CM

## 2023-04-04 PROCEDURE — 97110 THERAPEUTIC EXERCISES: CPT | Mod: GO | Performed by: OCCUPATIONAL THERAPIST

## 2023-04-04 NOTE — PROGRESS NOTES
SOAP note objective information for 4/4/2023:    Diagnosis:   Left distal radius and ulna fracture  DOI:  12/28/2022    ROM  Wrist 3/29/2023 3/29/2023 4/4/2023   AROM (PROM) Right Left Left   Extension 60 40 (50) 45   Flexion 70 30 (35) 45   RD 20 0    UD 40 15    Supination 75 35 (45) 60   Pronation 80 75      Home Program:   Warmth for stiffness  AROM fingers with 3 fist tendon gliding  AROM thumb E/F and opposition  AROM of wrist E/F, R/U and P/S  PROM wrist E/F   and pinch strengthening  Wean OTC wrist splint as able  Encourage functional use of left hand     Next Visit:  See in 1 week  Assess response to PROM for wrist E/F  Add to wrist isotonics  Add joint mobs to facilitate wrist ROM as indicated     Please refer to the daily flowsheet for treatment today, total treatment time and time spent performing 1:1 timed codes.

## 2023-04-13 ENCOUNTER — THERAPY VISIT (OUTPATIENT)
Dept: OCCUPATIONAL THERAPY | Facility: CLINIC | Age: 68
End: 2023-04-13
Payer: COMMERCIAL

## 2023-04-13 DIAGNOSIS — M25.632 WRIST STIFFNESS, LEFT: Primary | ICD-10-CM

## 2023-04-13 DIAGNOSIS — S52.502D CLOSED FRACTURE OF DISTAL ENDS OF LEFT RADIUS AND ULNA WITH ROUTINE HEALING: ICD-10-CM

## 2023-04-13 DIAGNOSIS — S52.602D CLOSED FRACTURE OF DISTAL ENDS OF LEFT RADIUS AND ULNA WITH ROUTINE HEALING: ICD-10-CM

## 2023-04-13 PROCEDURE — 97140 MANUAL THERAPY 1/> REGIONS: CPT | Mod: GO | Performed by: OCCUPATIONAL THERAPIST

## 2023-04-13 PROCEDURE — 97110 THERAPEUTIC EXERCISES: CPT | Mod: GO | Performed by: OCCUPATIONAL THERAPIST

## 2023-04-13 NOTE — PROGRESS NOTES
SOAP note objective information for 4/13/2023:    Diagnosis:   Left distal radius and ulna fracture  DOI:  12/28/2022    ROM  Wrist 3/29/2023 3/29/2023 4/4/2023 4/13/2023   AROM (PROM) Right Left Left Left   Extension 60 40 (50) 45 50   Flexion  After Tx 70 30 (35) 45 45 (50)  50   RD 20 0     UD 40 15     Supination 75 35 (45) 60 65   Pronation 80 75       Home Program:   Warmth for stiffness  AROM fingers with 3 fist tendon gliding  AROM thumb E/F and opposition  AROM of wrist E/F, R/U and P/S  PROM wrist E/F   and pinch strengthening  Wrist E/F isotonics   Wean OTC wrist splint as able  Encourage functional use of left hand     Next Visit:  See in 1 week  Assess response to wrist isotonics  Cont joint mobs to facilitate wrist ROM     Please refer to the daily flowsheet for treatment today, total treatment time and time spent performing 1:1 timed codes.

## 2023-04-18 NOTE — PROGRESS NOTES
Chief Complaint:   Chief Complaint   Patient presents with     Left Wrist - RECHECK     W/C. Fracture. DOI 12/28/22, 16 wk s/p. Patient notes she is doing ok but still has a little pain. She has been to hand therapy, has been helping. She is not wearing the brace anymore.       INJURY: nondisplaced left distal radius fracture  DATE of INJURY: 12/28/2022    Today's encounter utilized a language specific  via iPad to obtain the HPI, PAST MEDICAL/SURGICAL history, social history, family history, medications and allergies and review of systems; in addition to perform physical examination; review pertinent imaging studies; discuss exam findings and assessment; and go over treatment plan.        HPI: Ara Newby is a 67 year old female , right -hand dominant, who presents for followup evaluation and management of a left wrist injury. Returns today in brace, doing well. Not much pain unless uses it or bends it too far. shes going the therapy. 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 Pain today 1/10.    She doesn't feel like she can go back to her regular work duties quite yet.    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  Seen in Urgent Care 1/2/2023, with xrays of the forearm obtained noting a nondisplaced distal radius and ulna fracture. Had worsened pain due to the splint not fitting well, so presented to [facility] ED on 1/14/2023, with repeat xrays showing stable fracture.      It has been almost 4 months since the initial injury.      She is diabetic, last A1c=10.6  in our system 6/23/2021.          Past medical history:  has a past medical history of Diabetes mellitus, type 2 (H) and Hypertension.   Patient Active Problem List    Diagnosis Date Noted     Closed fracture of distal ends of left radius and ulna with routine healing 03/29/2023     Priority: Medium      Wrist stiffness, left 03/29/2023     Priority: Medium     Past surgical history:  has no past surgical history on file.     Medications:    Current Outpatient Medications   Medication Sig Dispense Refill     acetaminophen (TYLENOL) 325 MG tablet        amLODIPine (NORVASC) 10 MG tablet TAKE ONE TABLET BY MOUTH ONCE DAILY 90 tablet 1     blood glucose (NO BRAND SPECIFIED) test strip Use to test blood sugar 3 times daily or as directed. 300 strip 0     glipiZIDE (GLUCOTROL XL) 10 MG 24 hr tablet TAKE ONE TABLET BY MOUTH TWICE A DAY 90 tablet 0     ibuprofen (ADVIL/MOTRIN) 200 MG tablet Take 3 tablets (600 mg) by mouth every 6 hours as needed for mild pain 30 tablet 0     insulin aspart prot & aspart (NOVOLOG MIX 70/30 FLEXPEN) (70-30) 100 UNIT/ML pen INJECT 16 UNITS TWICE DAILY AS NEEDED FOR HIGH BLOOD SUGAR 45 mL 0     insulin pen needle (B-D U/F) 31G X 5 MM miscellaneous Use twice daily with Novolog pen 200 each 0     metFORMIN (GLUCOPHAGE) 1000 MG tablet Take 1 tablet (1,000 mg) by mouth 2 times daily (with meals) 360 tablet 1     metoprolol succinate ER (TOPROL-XL) 50 MG 24 hr tablet Take 1 tablet (50 mg) by mouth daily 90 tablet 1        Allergies:   No Known Allergies     Family History: family history is not on file.     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 She has never used smokeless tobacco. She reports that she does not drink alcohol and does not use drugs.    Review of Systems:     Denies numbness, tingling, parasthesias.   Denies headaches.   Denies fevers, chills, night sweats   Denies chest pain.   Denies shortness of breath.   Denies any skin problems, abrasions, rashes, irritation.      Physical Exam  GENERAL APPEARANCE: healthy, alert, no distress. ( via iPad)  SKIN: no suspicious lesions or rashes  NEURO: Normal strength and tone, mentation intact and speech normal  PSYCH:  mentation appears normal and affect normal. Not  "anxious.  RESPIRATORY: No increased work of breathing.    BP (!) 148/88   Pulse 92   Ht 1.676 m (5' 6\")   Wt 82.7 kg (182 lb 4.8 oz)   LMP  (LMP Unknown)   BMI 29.42 kg/m       left WRIST/HAND EXAM:    Skin intact.   Normal wear pattern, color and tone.    There is no swelling in the hand, fingers, wrist of the left upper extremity .  There is mild tenderness in the distal radius, distal ulna of the wrist.  There is no maceration of the skin.  There is no gross deformity in the area.  Wrist range of motion: 60 degrees extension, 50 degrees flexion; discomfort at extremes.    Intact sensation light touch median, radial, ulnar nerves of the hand  Intact sensation to the radial and ulnar digital nerves of the fingers, as well as the finger tips.  Intact epl fpl fdp edc wrist flexion/extension biceps/triceps deltoid  Brisk capillary refill to all fingers.   Palpable radial pulse, 2+.    X-rays:  3 views left wrist from 4/20/2023 were reviewed in clinic today. On my review, stable alignment of previously noted impacted intra-articular distal radius fracture and slightly distracted ulnar styloid fracture. Fracture planes are not visible with decreased  sclerosis and signs of further healing compared to previous images.. No new findings. Small lucency in the thumb metacarpal shaft measuring 6 mm, nonspecific but may reflect an enchondroma.     .    Assessment: 68yo RHD female with nondisplaced, impacted left distal radius fracture status post fall..    Plan:  Nonop fracture management    * exam, images reviewed; fracture essentially healed.  * discontinue brace immobilization, work on range of motion. I think some of her discomfort is due to stiffness.  * continue with hand therapy for range of motion, strengthening.    * wrist, finger range of motion.  * elevation.  * light weight bearing, 2lbs.  * workability: light duty. 2lbs restriction.    * return to clinic 4 weeks. No xrays, just clinical recheck and " workability reassessment.      * All questions were addressed and answered prior to discharge from clinic today. The patient acknowledges an understanding of and agreement with the plan set forth during today's visit. Patient was advised to call our office or MyChart us if any further questions arise upon leaving our office today.        Kaushik Zhou M.D., M.S.  Dept. of Orthopaedic Surgery  [facility]

## 2023-04-20 ENCOUNTER — THERAPY VISIT (OUTPATIENT)
Dept: OCCUPATIONAL THERAPY | Facility: CLINIC | Age: 68
End: 2023-04-20
Payer: OTHER MISCELLANEOUS

## 2023-04-20 ENCOUNTER — ANCILLARY PROCEDURE (OUTPATIENT)
Dept: GENERAL RADIOLOGY | Facility: CLINIC | Age: 68
End: 2023-04-20
Attending: ORTHOPAEDIC SURGERY
Payer: OTHER MISCELLANEOUS

## 2023-04-20 ENCOUNTER — OFFICE VISIT (OUTPATIENT)
Dept: ORTHOPEDICS | Facility: CLINIC | Age: 68
End: 2023-04-20
Payer: OTHER MISCELLANEOUS

## 2023-04-20 VITALS
HEART RATE: 92 BPM | BODY MASS INDEX: 29.3 KG/M2 | HEIGHT: 66 IN | DIASTOLIC BLOOD PRESSURE: 88 MMHG | WEIGHT: 182.3 LBS | SYSTOLIC BLOOD PRESSURE: 148 MMHG

## 2023-04-20 DIAGNOSIS — S52.502D CLOSED FRACTURE OF DISTAL ENDS OF LEFT RADIUS AND ULNA WITH ROUTINE HEALING: ICD-10-CM

## 2023-04-20 DIAGNOSIS — S52.602D CLOSED FRACTURE OF DISTAL ENDS OF LEFT RADIUS AND ULNA WITH ROUTINE HEALING: ICD-10-CM

## 2023-04-20 DIAGNOSIS — M25.632 WRIST STIFFNESS, LEFT: Primary | ICD-10-CM

## 2023-04-20 DIAGNOSIS — S52.602D CLOSED FRACTURE OF DISTAL ENDS OF LEFT RADIUS AND ULNA WITH ROUTINE HEALING: Primary | ICD-10-CM

## 2023-04-20 DIAGNOSIS — S52.502D CLOSED FRACTURE OF DISTAL ENDS OF LEFT RADIUS AND ULNA WITH ROUTINE HEALING: Primary | ICD-10-CM

## 2023-04-20 PROCEDURE — 97140 MANUAL THERAPY 1/> REGIONS: CPT | Mod: GO | Performed by: OCCUPATIONAL THERAPIST

## 2023-04-20 PROCEDURE — 99213 OFFICE O/P EST LOW 20 MIN: CPT | Performed by: ORTHOPAEDIC SURGERY

## 2023-04-20 PROCEDURE — 73110 X-RAY EXAM OF WRIST: CPT | Mod: TC | Performed by: RADIOLOGY

## 2023-04-20 PROCEDURE — 97110 THERAPEUTIC EXERCISES: CPT | Mod: GO | Performed by: OCCUPATIONAL THERAPIST

## 2023-04-20 ASSESSMENT — PAIN SCALES - GENERAL: PAINLEVEL: NO PAIN (1)

## 2023-04-20 NOTE — PROGRESS NOTES
SOAP note objective information for 4/20/2023:    Diagnosis:   Left distal radius and ulna fracture  DOI:  12/28/2022    ROM  Wrist 3/29/2023 3/29/2023 4/4/2023 4/13/2023 4/20/2023   AROM (PROM) Right Left Left Left Left   Extension 60 40 (50) 45 50 60   Flexion  After Tx 70 30 (35) 45 45 (50)  50 45 (55)  55 (60)   RD 20 0   15   UD 40 15   30   Supination 75 35 (45) 60 65 75   Pronation 80 75   75     Strength   (Measured in pounds)  Pain Report: - none  + mild    ++ moderate    +++ severe    3/29/2023 3/29/2023 4/20/2023   Trials Right Left Left   1  2  3 30  36  24 0+  1 trial due to pain 28- 24- 27-   Average 30 0 26     Home Program:   Warmth for stiffness  AROM fingers with 3 fist tendon gliding  AROM thumb E/F and opposition  AROM of wrist E/F, R/U and P/S  PROM wrist E/F   and pinch strengthening  Wrist E/F isotonics   Weight bearing on table top as tolerated   Discontinue OTC wrist splint   Wear tubigrip sleeve for support as needed  Encourage functional use of left hand     Next Visit:  See in 1 week  Assess response to weight bearing and tubigrip sleeve  Cont joint mobs to facilitate wrist ROM   Taper to HEP as progress continues     Please refer to the daily flowsheet for treatment today, total treatment time and time spent performing 1:1 timed codes.

## 2023-04-20 NOTE — LETTER
April 20, 2023      Ara Newby  [address] [place] [facility] [address]        To Whom It May Concern,      Ara Newby attended clinic here on April 20,, 2023 and she can return to work with restrictions: light duty only, 2lb lifting restrictions. No heavy lifting, pushing, pulling with left upper extremity.  Return to clinic in 4 weeks     If you have questions or concerns, please call the clinic at the number listed above.     Sincerely,            Devon Hernandez PA-C, CAQ-OS  Dept. of Orthopedic Surgery  [facility]

## 2023-04-20 NOTE — LETTER
4/20/2023         RE: Ara Newby  [address]  [place] [address]        Dear Colleague,    Thank you for referring your patient, Ara Newby, to the [facility]. Please see a copy of my visit note below.    Chief Complaint:   Chief Complaint   Patient presents with     Left Wrist - RECHECK     W/C. Fracture. DOI 12/28/22, 16 wk s/p. Patient notes she is doing ok but still has a little pain. She has been to hand therapy, has been helping. She is not wearing the brace anymore.       INJURY: nondisplaced left distal radius fracture  DATE of INJURY: 12/28/2022    Today's encounter utilized a language specific  via iPad to obtain the HPI, PAST MEDICAL/SURGICAL history, social history, family history, medications and allergies and review of systems; in addition to perform physical examination; review pertinent imaging studies; discuss exam findings and assessment; and go over treatment plan.        HPI: Ara Newby is a 67 year old female , right -hand dominant, who presents for followup evaluation and management of a left wrist injury. Returns today in brace, doing well. Not much pain unless uses it or bends it too far. shes going the therapy. 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 Pain today 1/10.    She doesn't feel like she can go back to her regular work duties quite yet.    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  Seen in Urgent Care 1/2/2023, with xrays of the forearm obtained noting a nondisplaced distal radius and ulna fracture. Had worsened pain due to the splint not fitting well, so presented to [facility] ED on 1/14/2023, with repeat xrays showing stable fracture.      It has been almost 4 months since the initial injury.      She is diabetic, last A1c=10.6  in our system 6/23/2021.          Past medical  history:  has a past medical history of Diabetes mellitus, type 2 (H) and Hypertension.   Patient Active Problem List    Diagnosis Date Noted     Closed fracture of distal ends of left radius and ulna with routine healing 03/29/2023     Priority: Medium     Wrist stiffness, left 03/29/2023     Priority: Medium     Past surgical history:  has no past surgical history on file.     Medications:    Current Outpatient Medications   Medication Sig Dispense Refill     acetaminophen (TYLENOL) 325 MG tablet        amLODIPine (NORVASC) 10 MG tablet TAKE ONE TABLET BY MOUTH ONCE DAILY 90 tablet 1     blood glucose (NO BRAND SPECIFIED) test strip Use to test blood sugar 3 times daily or as directed. 300 strip 0     glipiZIDE (GLUCOTROL XL) 10 MG 24 hr tablet TAKE ONE TABLET BY MOUTH TWICE A DAY 90 tablet 0     ibuprofen (ADVIL/MOTRIN) 200 MG tablet Take 3 tablets (600 mg) by mouth every 6 hours as needed for mild pain 30 tablet 0     insulin aspart prot & aspart (NOVOLOG MIX 70/30 FLEXPEN) (70-30) 100 UNIT/ML pen INJECT 16 UNITS TWICE DAILY AS NEEDED FOR HIGH BLOOD SUGAR 45 mL 0     insulin pen needle (B-D U/F) 31G X 5 MM miscellaneous Use twice daily with Novolog pen 200 each 0     metFORMIN (GLUCOPHAGE) 1000 MG tablet Take 1 tablet (1,000 mg) by mouth 2 times daily (with meals) 360 tablet 1     metoprolol succinate ER (TOPROL-XL) 50 MG 24 hr tablet Take 1 tablet (50 mg) by mouth daily 90 tablet 1        Allergies:   No Known Allergies     Family History: family history is not on file.     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 She has never used smokeless tobacco. She reports that she does not drink alcohol and does not use drugs.    Review of Systems:     Denies numbness, tingling, parasthesias.   Denies headaches.   Denies fevers, chills, night sweats   Denies chest pain.   Denies shortness of breath.   Denies any skin problems, abrasions, rashes,  "irritation.      Physical Exam  GENERAL APPEARANCE: healthy, alert, no distress. ( via iPad)  SKIN: no suspicious lesions or rashes  NEURO: Normal strength and tone, mentation intact and speech normal  PSYCH:  mentation appears normal and affect normal. Not anxious.  RESPIRATORY: No increased work of breathing.    BP (!) 148/88   Pulse 92   Ht 1.676 m (5' 6\")   Wt 82.7 kg (182 lb 4.8 oz)   LMP  (LMP Unknown)   BMI 29.42 kg/m       left WRIST/HAND EXAM:    Skin intact.   Normal wear pattern, color and tone.    There is no swelling in the hand, fingers, wrist of the left upper extremity .  There is mild tenderness in the distal radius, distal ulna of the wrist.  There is no maceration of the skin.  There is no gross deformity in the area.  Wrist range of motion: 60 degrees extension, 50 degrees flexion; discomfort at extremes.    Intact sensation light touch median, radial, ulnar nerves of the hand  Intact sensation to the radial and ulnar digital nerves of the fingers, as well as the finger tips.  Intact epl fpl fdp edc wrist flexion/extension biceps/triceps deltoid  Brisk capillary refill to all fingers.   Palpable radial pulse, 2+.    X-rays:  3 views left wrist from 4/20/2023 were reviewed in clinic today. On my review, stable alignment of previously noted impacted intra-articular distal radius fracture and slightly distracted ulnar styloid fracture. Fracture planes are not visible with decreased  sclerosis and signs of further healing compared to previous images.. No new findings. Small lucency in the thumb metacarpal shaft measuring 6 mm, nonspecific but may reflect an enchondroma.     .    Assessment: 68yo RHD female with nondisplaced, impacted left distal radius fracture status post fall..    Plan:  Nonop fracture management    * exam, images reviewed; fracture essentially healed.  * discontinue brace immobilization, work on range of motion. I think some of her discomfort is due to " stiffness.  * continue with hand therapy for range of motion, strengthening.    * wrist, finger range of motion.  * elevation.  * light weight bearing, 2lbs.  * workability: light duty. 2lbs restriction.    * return to clinic 4 weeks. No xrays, just clinical recheck and workability reassessment.      * All questions were addressed and answered prior to discharge from clinic today. The patient acknowledges an understanding of and agreement with the plan set forth during today's visit. Patient was advised to call our office or MyChart us if any further questions arise upon leaving our office today.        Kaushik Zhou M.D., M.S.  Dept. of Orthopaedic Surgery  [facility]      Again, thank you for allowing me to participate in the care of your patient.        Sincerely,        Kaushik Zhou MD

## 2023-05-18 ENCOUNTER — OFFICE VISIT (OUTPATIENT)
Dept: ORTHOPEDICS | Facility: CLINIC | Age: 68
End: 2023-05-18
Payer: COMMERCIAL

## 2023-05-18 ENCOUNTER — ANCILLARY PROCEDURE (OUTPATIENT)
Dept: GENERAL RADIOLOGY | Facility: CLINIC | Age: 68
End: 2023-05-18
Attending: PHYSICIAN ASSISTANT
Payer: COMMERCIAL

## 2023-05-18 VITALS — HEIGHT: 66 IN | BODY MASS INDEX: 29.25 KG/M2 | WEIGHT: 182 LBS

## 2023-05-18 DIAGNOSIS — S52.502A NONDISPLACED FRACTURE OF DISTAL END OF LEFT RADIUS: ICD-10-CM

## 2023-05-18 DIAGNOSIS — M65.4 TENOSYNOVITIS, DE QUERVAIN: ICD-10-CM

## 2023-05-18 DIAGNOSIS — S52.502A NONDISPLACED FRACTURE OF DISTAL END OF LEFT RADIUS: Primary | ICD-10-CM

## 2023-05-18 PROCEDURE — 99213 OFFICE O/P EST LOW 20 MIN: CPT | Mod: 25 | Performed by: ORTHOPAEDIC SURGERY

## 2023-05-18 PROCEDURE — 20550 NJX 1 TENDON SHEATH/LIGAMENT: CPT | Mod: LT | Performed by: ORTHOPAEDIC SURGERY

## 2023-05-18 PROCEDURE — 73110 X-RAY EXAM OF WRIST: CPT | Mod: TC | Performed by: RADIOLOGY

## 2023-05-18 RX ADMIN — TRIAMCINOLONE ACETONIDE 40 MG: 40 INJECTION, SUSPENSION INTRA-ARTICULAR; INTRAMUSCULAR at 14:13

## 2023-05-18 ASSESSMENT — PAIN SCALES - GENERAL: PAINLEVEL: MILD PAIN (2)

## 2023-05-18 NOTE — PROGRESS NOTES
Chief Complaint:   Chief Complaint   Patient presents with     Left Wrist - Follow Up     W/C. DOI: 12/28/22. 20 weeks out from injury. Doing pretty well. Some achy pain in the wrist.         INJURY: nondisplaced left distal radius fracture  DATE of INJURY: 12/28/2022    Today's encounter utilized a language specific  via iPad to obtain the HPI, PAST MEDICAL/SURGICAL history, social history, family history, medications and allergies and review of systems; in addition to perform physical examination; review pertinent imaging studies; discuss exam findings and assessment; and go over treatment plan.        HPI: Ara Newby is a 67 year old female , right -hand dominant, who presents for followup evaluation and management of a left wrist injury. Returns today in brace, doing well. Not much pain unless uses it too much during the day. Sometimes does some heavy work and then that night will be sore.    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    She doesn't feel like she can go back to her regular work duties quite yet.    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  Seen in Urgent Care 1/2/2023, with xrays of the forearm obtained noting a nondisplaced distal radius and ulna fracture. Had worsened pain due to the splint not fitting well, so presented to [facility] ED on 1/14/2023, with repeat xrays showing stable fracture.      It has been almost 5 months since the initial injury.      She is diabetic, last A1c=10.6  in our system 6/23/2021.          Past medical history:  has a past medical history of Diabetes mellitus, type 2 (H) and Hypertension.   Patient Active Problem List    Diagnosis Date Noted     Closed fracture of distal ends of left radius and ulna with routine healing 03/29/2023     Priority: Medium     Wrist stiffness, left 03/29/2023      Priority: Medium     Past surgical history:  has no past surgical history on file.     Medications:    Current Outpatient Medications   Medication Sig Dispense Refill     acetaminophen (TYLENOL) 325 MG tablet        amLODIPine (NORVASC) 10 MG tablet TAKE ONE TABLET BY MOUTH ONCE DAILY 90 tablet 1     blood glucose (NO BRAND SPECIFIED) test strip Use to test blood sugar 3 times daily or as directed. 300 strip 0     glipiZIDE (GLUCOTROL XL) 10 MG 24 hr tablet TAKE ONE TABLET BY MOUTH TWICE A DAY 90 tablet 0     ibuprofen (ADVIL/MOTRIN) 200 MG tablet Take 3 tablets (600 mg) by mouth every 6 hours as needed for mild pain 30 tablet 0     insulin aspart prot & aspart (NOVOLOG MIX 70/30 FLEXPEN) (70-30) 100 UNIT/ML pen INJECT 16 UNITS TWICE DAILY AS NEEDED FOR HIGH BLOOD SUGAR 45 mL 0     insulin pen needle (B-D U/F) 31G X 5 MM miscellaneous Use twice daily with Novolog pen 200 each 0     metFORMIN (GLUCOPHAGE) 1000 MG tablet Take 1 tablet (1,000 mg) by mouth 2 times daily (with meals) 360 tablet 1     metoprolol succinate ER (TOPROL-XL) 50 MG 24 hr tablet Take 1 tablet (50 mg) by mouth daily 90 tablet 1        Allergies:   No Known Allergies     Family History: family history is not on file.     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 She has never used smokeless tobacco. She reports that she does not drink alcohol and does not use drugs.    Review of Systems:     Denies numbness, tingling, parasthesias.   Denies headaches.   Denies fevers, chills, night sweats   Denies chest pain.   Denies shortness of breath.   Denies any skin problems, abrasions, rashes, irritation.      Physical Exam  GENERAL APPEARANCE: healthy, alert, no distress. ( via iPad)  SKIN: no suspicious lesions or rashes  NEURO: Normal strength and tone, mentation intact and speech normal  PSYCH:  mentation appears normal and affect normal. Not anxious.  RESPIRATORY: No increased work of  "breathing.    Ht 1.676 m (5' 6\")   Wt 82.6 kg (182 lb)   LMP  (LMP Unknown)   BMI 29.38 kg/m       left WRIST/HAND EXAM:    Skin intact.   Normal wear pattern, color and tone.    There is no swelling in the hand, fingers, wrist of the left upper extremity .  There is mild tenderness in the distal radius, distal ulna of the wrist.  More severe tender to palpation over the 1st extensor compartment  Positive Finkelsteins test.  There is no maceration of the skin.  There is no gross deformity in the area.  Wrist range of motion: 70 degrees extension, 50 degrees flexion; discomfort at extremes.    Intact sensation light touch median, radial, ulnar nerves of the hand  Intact sensation to the radial and ulnar digital nerves of the fingers, as well as the finger tips.  Intact epl fpl fdp edc wrist flexion/extension biceps/triceps deltoid  Brisk capillary refill to all fingers.   Palpable radial pulse, 2+.    X-rays:  3 views left wrist from 5/18/2023 were reviewed in clinic today. On my review, fully healed fracture of the distal radius without any fracture lucency visible. Ununited ulnar styloid fragment.  No new findings. Small lucency in the thumb metacarpal shaft measuring 6 mm, nonspecific but may reflect an enchondroma.     .    Assessment: 66yo RHD female with nondisplaced, impacted left distal radius fracture status post fall, healed. Also some deQuervains tenosynovitis..    Plan:    * exam, images reviewed; fracture is healed.  * appears to have some tendinitis in the wrist.  * appears more soft tissue pain now, some stiffness and tendinitis.  * continue with hand therapy for range of motion, strengthening.  * discussed trial of injection for dequervains. See procedure note below. We did discuss risk of skin bleaching, usually temporary, given subcutaneous location of the injection and darker pigmented skin.  * wrist, finger range of motion.  * elevation.  * light weight bearing, 2lbs.  * workability: light " duty. 2lbs restriction.    * return to clinic 4 weeks. No xrays, just clinical recheck and workability reassessment.      * All questions were addressed and answered prior to discharge from clinic today. The patient acknowledges an understanding of and agreement with the plan set forth during today's visit. Patient was advised to call our office or MyChart us if any further questions arise upon leaving our office today.        Kaushik Zhou M.D., M.S.  Dept. of Orthopaedic Surgery  [facility]    Hand / Upper Extremity Injection/Arthrocentesis: L extensor compartment 1    Date/Time: 5/18/2023 2:13 PM    Performed by: Devon Hernandez PA  Authorized by: Kaushik Zhou MD    Indications:  Pain  Needle Size:  25 G  Guidance: landmark    Approach:  Radial  Condition: de Quervain's      Site:  L extensor compartment 1  Medications:  40 mg triamcinolone 40 MG/ML  Outcome:  Tolerated well, no immediate complications  Procedure discussed: discussed risks, benefits, and alternatives    Consent Given by:  Patient  Prep: patient was prepped and draped in usual sterile fashion

## 2023-05-18 NOTE — LETTER
May 18, 2023      Ara Newby  [address] [facility] [address]        To Whom It May Concern,      Ara Newby attended clinic here on Mar 18, 2023 and she can return to work with restrictions: light duty only, 2lb lifting restrictions. No heavy lifting, pushing, pulling with left upper extremity.  Return to clinic in 4 weeks     If you have questions or concerns, please call the clinic at the number listed above.     Sincerely,        Devon Hernandez PA-C, CAQ-OS  Dept. of Orthopedic Surgery  [facility]

## 2023-05-18 NOTE — LETTER
5/18/2023         RE: Ara Newby  [address]  [place] [address]        Dear Colleague,    Thank you for referring your patient, Ara Newby, to the [facility]. Please see a copy of my visit note below.    Chief Complaint:   Chief Complaint   Patient presents with     Left Wrist - Follow Up     W/C. DOI: 12/28/22. 20 weeks out from injury. Doing pretty well. Some achy pain in the wrist.         INJURY: nondisplaced left distal radius fracture  DATE of INJURY: 12/28/2022    Today's encounter utilized a language specific  via iPad to obtain the HPI, PAST MEDICAL/SURGICAL history, social history, family history, medications and allergies and review of systems; in addition to perform physical examination; review pertinent imaging studies; discuss exam findings and assessment; and go over treatment plan.        HPI: Ara Newby is a 67 year old female , right -hand dominant, who presents for followup evaluation and management of a left wrist injury. Returns today in brace, doing well. Not much pain unless uses it too much during the day. Sometimes does some heavy work and then that night will be sore.    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    She doesn't feel like she can go back to her regular work duties quite yet.    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  Seen in Urgent Care 1/2/2023, with xrays of the forearm obtained noting a nondisplaced distal radius and ulna fracture. Had worsened pain due to the splint not fitting well, so presented to [facility] ED on 1/14/2023, with repeat xrays showing stable fracture.      It has been almost 5 months since the initial injury.      She is diabetic, last A1c=10.6  in our system 6/23/2021.          Past medical history:  has a past medical history of  Diabetes mellitus, type 2 (H) and Hypertension.   Patient Active Problem List    Diagnosis Date Noted     Closed fracture of distal ends of left radius and ulna with routine healing 03/29/2023     Priority: Medium     Wrist stiffness, left 03/29/2023     Priority: Medium     Past surgical history:  has no past surgical history on file.     Medications:    Current Outpatient Medications   Medication Sig Dispense Refill     acetaminophen (TYLENOL) 325 MG tablet        amLODIPine (NORVASC) 10 MG tablet TAKE ONE TABLET BY MOUTH ONCE DAILY 90 tablet 1     blood glucose (NO BRAND SPECIFIED) test strip Use to test blood sugar 3 times daily or as directed. 300 strip 0     glipiZIDE (GLUCOTROL XL) 10 MG 24 hr tablet TAKE ONE TABLET BY MOUTH TWICE A DAY 90 tablet 0     ibuprofen (ADVIL/MOTRIN) 200 MG tablet Take 3 tablets (600 mg) by mouth every 6 hours as needed for mild pain 30 tablet 0     insulin aspart prot & aspart (NOVOLOG MIX 70/30 FLEXPEN) (70-30) 100 UNIT/ML pen INJECT 16 UNITS TWICE DAILY AS NEEDED FOR HIGH BLOOD SUGAR 45 mL 0     insulin pen needle (B-D U/F) 31G X 5 MM miscellaneous Use twice daily with Novolog pen 200 each 0     metFORMIN (GLUCOPHAGE) 1000 MG tablet Take 1 tablet (1,000 mg) by mouth 2 times daily (with meals) 360 tablet 1     metoprolol succinate ER (TOPROL-XL) 50 MG 24 hr tablet Take 1 tablet (50 mg) by mouth daily 90 tablet 1        Allergies:   No Known Allergies     Family History: family history is not on file.     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 She has never used smokeless tobacco. She reports that she does not drink alcohol and does not use drugs.    Review of Systems:     Denies numbness, tingling, parasthesias.   Denies headaches.   Denies fevers, chills, night sweats   Denies chest pain.   Denies shortness of breath.   Denies any skin problems, abrasions, rashes, irritation.      Physical Exam  GENERAL APPEARANCE: healthy,  "alert, no distress. ( via iPad)  SKIN: no suspicious lesions or rashes  NEURO: Normal strength and tone, mentation intact and speech normal  PSYCH:  mentation appears normal and affect normal. Not anxious.  RESPIRATORY: No increased work of breathing.    Ht 1.676 m (5' 6\")   Wt 82.6 kg (182 lb)   LMP  (LMP Unknown)   BMI 29.38 kg/m       left WRIST/HAND EXAM:    Skin intact.   Normal wear pattern, color and tone.    There is no swelling in the hand, fingers, wrist of the left upper extremity .  There is mild tenderness in the distal radius, distal ulna of the wrist.  More severe tender to palpation over the 1st extensor compartment  Positive Finkelsteins test.  There is no maceration of the skin.  There is no gross deformity in the area.  Wrist range of motion: 70 degrees extension, 50 degrees flexion; discomfort at extremes.    Intact sensation light touch median, radial, ulnar nerves of the hand  Intact sensation to the radial and ulnar digital nerves of the fingers, as well as the finger tips.  Intact epl fpl fdp edc wrist flexion/extension biceps/triceps deltoid  Brisk capillary refill to all fingers.   Palpable radial pulse, 2+.    X-rays:  3 views left wrist from 5/18/2023 were reviewed in clinic today. On my review, fully healed fracture of the distal radius without any fracture lucency visible. Ununited ulnar styloid fragment.  No new findings. Small lucency in the thumb metacarpal shaft measuring 6 mm, nonspecific but may reflect an enchondroma.     .    Assessment: 66yo RHD female with nondisplaced, impacted left distal radius fracture status post fall, healed. Also some deQuervains tenosynovitis..    Plan:    * exam, images reviewed; fracture is healed.  * appears to have some tendinitis in the wrist.  * appears more soft tissue pain now, some stiffness and tendinitis.  * continue with hand therapy for range of motion, strengthening.  * discussed trial of injection for dequervains. See " procedure note below. We did discuss risk of skin bleaching, usually temporary, given subcutaneous location of the injection and darker pigmented skin.  * wrist, finger range of motion.  * elevation.  * light weight bearing, 2lbs.  * workability: light duty. 2lbs restriction.    * return to clinic 4 weeks. No xrays, just clinical recheck and workability reassessment.      * All questions were addressed and answered prior to discharge from clinic today. The patient acknowledges an understanding of and agreement with the plan set forth during today's visit. Patient was advised to call our office or MyChart us if any further questions arise upon leaving our office today.        Kaushik Zhou M.D., M.S.  Dept. of Orthopaedic Surgery  [facility]    Hand / Upper Extremity Injection/Arthrocentesis: L extensor compartment 1    Date/Time: 5/18/2023 2:13 PM    Performed by: Devon Hernandez PA  Authorized by: Kaushik Zhou MD    Indications:  Pain  Needle Size:  25 G  Guidance: landmark    Approach:  Radial  Condition: de Quervain's      Site:  L extensor compartment 1  Medications:  40 mg triamcinolone 40 MG/ML  Outcome:  Tolerated well, no immediate complications  Procedure discussed: discussed risks, benefits, and alternatives    Consent Given by:  Patient  Prep: patient was prepped and draped in usual sterile fashion              Again, thank you for allowing me to participate in the care of your patient.        Sincerely,        Kaushik Zhou MD

## 2023-05-19 RX ORDER — TRIAMCINOLONE ACETONIDE 40 MG/ML
40 INJECTION, SUSPENSION INTRA-ARTICULAR; INTRAMUSCULAR
Status: SHIPPED | OUTPATIENT
Start: 2023-05-18

## 2023-06-13 NOTE — PROGRESS NOTES
Chief Complaint:   Chief Complaint   Patient presents with     Left Wrist - Pain     Work Comp. DOI: 12/28/22. 5.5 months. Her employer had no work for her with the restrictions in place. SHe reports today that she has no pain. She has been gardening and active with the wrist and it doesn't bother her.        INJURY: nondisplaced left distal radius fracture  DATE of INJURY: 12/28/2022    Today's encounter utilized a language specific  via iPad to obtain the HPI, PAST MEDICAL/SURGICAL history, social history, family history, medications and allergies and review of systems; in addition to perform physical examination; review pertinent imaging studies; discuss exam findings and assessment; and go over treatment plan.        HPI: Ara Newby is a 67 year old female , right -hand dominant, who presents for followup evaluation and management of a left wrist injury. Returns today doing well, no pain. Has been gardening and more active without problems. She states her employer said they didn't have work for her this week so has not been working, not even light duty.    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 Pain today 0/10.    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  Seen in Urgent Care 1/2/2023, with xrays of the forearm obtained noting a nondisplaced distal radius and ulna fracture. Had worsened pain due to the splint not fitting well, so presented to [facility] ED on 1/14/2023, with repeat xrays showing stable fracture.      It has been 5.5 months since the initial injury.      She is diabetic, last A1c=10.6  in our system 6/23/2021.          Past medical history:  has a past medical history of Diabetes mellitus, type 2 (H) and Hypertension.   Patient Active Problem List    Diagnosis Date Noted     Closed fracture of distal ends of left radius and ulna with routine healing 03/29/2023     Priority: Medium     Wrist stiffness, left  03/29/2023     Priority: Medium     Past surgical history:  has no past surgical history on file.     Medications:    Current Outpatient Medications   Medication Sig Dispense Refill     acetaminophen (TYLENOL) 325 MG tablet        amLODIPine (NORVASC) 10 MG tablet TAKE ONE TABLET BY MOUTH ONCE DAILY 90 tablet 1     blood glucose (NO BRAND SPECIFIED) test strip Use to test blood sugar 3 times daily or as directed. 300 strip 0     glipiZIDE (GLUCOTROL XL) 10 MG 24 hr tablet TAKE ONE TABLET BY MOUTH TWICE A DAY 90 tablet 0     ibuprofen (ADVIL/MOTRIN) 200 MG tablet Take 3 tablets (600 mg) by mouth every 6 hours as needed for mild pain 30 tablet 0     insulin aspart prot & aspart (NOVOLOG MIX 70/30 FLEXPEN) (70-30) 100 UNIT/ML pen INJECT 16 UNITS TWICE DAILY AS NEEDED FOR HIGH BLOOD SUGAR 45 mL 0     insulin pen needle (B-D U/F) 31G X 5 MM miscellaneous Use twice daily with Novolog pen 200 each 0     metFORMIN (GLUCOPHAGE) 1000 MG tablet Take 1 tablet (1,000 mg) by mouth 2 times daily (with meals) 360 tablet 1     metoprolol succinate ER (TOPROL-XL) 50 MG 24 hr tablet Take 1 tablet (50 mg) by mouth daily 90 tablet 1        Allergies:   No Known Allergies     Family History: family history is not on file.     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 She has never used smokeless tobacco. She reports that she does not drink alcohol and does not use drugs.    Review of Systems:     Denies numbness, tingling, parasthesias.   Denies headaches.   Denies fevers, chills, night sweats   Denies chest pain.   Denies shortness of breath.   Denies any skin problems, abrasions, rashes, irritation.      Physical Exam  GENERAL APPEARANCE: healthy, alert, no distress. ( via iPad)  SKIN: no suspicious lesions or rashes  NEURO: Normal strength and tone, mentation intact and speech normal  PSYCH:  mentation appears normal and affect normal. Not anxious.  RESPIRATORY: No  "increased work of breathing.    Ht 1.676 m (5' 6\")   Wt 82.6 kg (182 lb)   LMP  (LMP Unknown)   BMI 29.38 kg/m       left WRIST/HAND EXAM:    Skin intact.   Normal wear pattern, color and tone.    There is no swelling in the hand, fingers, wrist of the left upper extremity .  There is no tenderness in the distal radius, distal ulna of the wrist.  Nontender to palpation  over the 1st extensor compartment  negative Finkelsteins test.  There is no maceration of the skin.  There is no gross deformity in the area.  Wrist range of motion: 70 degrees extension, 60 degrees flexion; discomfort at extremes.    Intact sensation light touch median, radial, ulnar nerves of the hand  Intact sensation to the radial and ulnar digital nerves of the fingers, as well as the finger tips.  Intact epl fpl fdp edc wrist flexion/extension biceps/triceps deltoid  Brisk capillary refill to all fingers.   Palpable radial pulse, 2+.      X-rays: no new images today.  3 views left wrist from 5/18/2023 were reviewed in clinic today. On my review, fully healed fracture of the distal radius without any fracture lucency visible. Ununited ulnar styloid fragment.  No new findings. Small lucency in the thumb metacarpal shaft measuring 6 mm, nonspecific but may reflect an enchondroma.       Assessment: 66yo RHD female with nondisplaced, impacted left distal radius fracture status post fall, healed. Also some deQuervains tenosynovitis, improved.        Plan:    * glad to hear she is doing better.  * continue with home exercise program.  * wrist, finger range of motion.  * elevation.  * progress weight bearing per comfort.  * workability: full duties, no restrictions.    * return to clinic as needed.      * All questions were addressed and answered prior to discharge from clinic today. The patient acknowledges an understanding of and agreement with the plan set forth during today's visit. Patient was advised to call our office or MyChart us if any " further questions arise upon leaving our office today.        Kaushik Zhou M.D., M.S.  Dept. of Orthopaedic Surgery  [facility]

## 2023-06-14 PROBLEM — S52.502D CLOSED FRACTURE OF DISTAL ENDS OF LEFT RADIUS AND ULNA WITH ROUTINE HEALING: Status: RESOLVED | Noted: 2023-03-29 | Resolved: 2023-06-14

## 2023-06-14 PROBLEM — M25.632 WRIST STIFFNESS, LEFT: Status: RESOLVED | Noted: 2023-03-29 | Resolved: 2023-06-14

## 2023-06-14 PROBLEM — S52.602D CLOSED FRACTURE OF DISTAL ENDS OF LEFT RADIUS AND ULNA WITH ROUTINE HEALING: Status: RESOLVED | Noted: 2023-03-29 | Resolved: 2023-06-14

## 2023-06-15 ENCOUNTER — OFFICE VISIT (OUTPATIENT)
Dept: ORTHOPEDICS | Facility: CLINIC | Age: 68
End: 2023-06-15
Payer: COMMERCIAL

## 2023-06-15 VITALS — HEIGHT: 66 IN | BODY MASS INDEX: 29.25 KG/M2 | WEIGHT: 182 LBS

## 2023-06-15 DIAGNOSIS — M65.4 TENOSYNOVITIS, DE QUERVAIN: ICD-10-CM

## 2023-06-15 DIAGNOSIS — S52.502S CLOSED FRACTURE OF DISTAL END OF LEFT RADIUS, UNSPECIFIED FRACTURE MORPHOLOGY, SEQUELA: Primary | ICD-10-CM

## 2023-06-15 PROCEDURE — 99213 OFFICE O/P EST LOW 20 MIN: CPT | Performed by: ORTHOPAEDIC SURGERY

## 2023-06-15 ASSESSMENT — PAIN SCALES - GENERAL: PAINLEVEL: NO PAIN (0)

## 2023-06-15 NOTE — LETTER
6/15/2023         RE: Ara Newby  [address]  [place] [address]        Dear Colleague,    Thank you for referring your patient, Ara Newby, to the [facility]. Please see a copy of my visit note below.    Chief Complaint:   Chief Complaint   Patient presents with     Left Wrist - Pain     Work Comp. DOI: 12/28/22. 5.5 months. Her employer had no work for her with the restrictions in place. SHe reports today that she has no pain. She has been gardening and active with the wrist and it doesn't bother her.        INJURY: nondisplaced left distal radius fracture  DATE of INJURY: 12/28/2022    Today's encounter utilized a language specific  via iPad to obtain the HPI, PAST MEDICAL/SURGICAL history, social history, family history, medications and allergies and review of systems; in addition to perform physical examination; review pertinent imaging studies; discuss exam findings and assessment; and go over treatment plan.        HPI: Ara Newby is a 67 year old female , right -hand dominant, who presents for followup evaluation and management of a left wrist injury. Returns today doing well, no pain. Has been gardening and more active without problems. She states her employer said they didn't have work for her this week so has not been working, not even light duty.    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 Pain today 0/10.    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  Seen in Urgent Care 1/2/2023, with xrays of the forearm obtained noting a nondisplaced distal radius and ulna fracture. Had worsened pain due to the splint not fitting well, so presented to [facility] ED on 1/14/2023, with repeat xrays showing stable fracture.      It has been 5.5 months since the initial injury.      She is diabetic, last A1c=10.6  in our system 6/23/2021.          Past medical history:  has a past  medical history of Diabetes mellitus, type 2 (H) and Hypertension.   Patient Active Problem List    Diagnosis Date Noted     Closed fracture of distal ends of left radius and ulna with routine healing 03/29/2023     Priority: Medium     Wrist stiffness, left 03/29/2023     Priority: Medium     Past surgical history:  has no past surgical history on file.     Medications:    Current Outpatient Medications   Medication Sig Dispense Refill     acetaminophen (TYLENOL) 325 MG tablet        amLODIPine (NORVASC) 10 MG tablet TAKE ONE TABLET BY MOUTH ONCE DAILY 90 tablet 1     blood glucose (NO BRAND SPECIFIED) test strip Use to test blood sugar 3 times daily or as directed. 300 strip 0     glipiZIDE (GLUCOTROL XL) 10 MG 24 hr tablet TAKE ONE TABLET BY MOUTH TWICE A DAY 90 tablet 0     ibuprofen (ADVIL/MOTRIN) 200 MG tablet Take 3 tablets (600 mg) by mouth every 6 hours as needed for mild pain 30 tablet 0     insulin aspart prot & aspart (NOVOLOG MIX 70/30 FLEXPEN) (70-30) 100 UNIT/ML pen INJECT 16 UNITS TWICE DAILY AS NEEDED FOR HIGH BLOOD SUGAR 45 mL 0     insulin pen needle (B-D U/F) 31G X 5 MM miscellaneous Use twice daily with Novolog pen 200 each 0     metFORMIN (GLUCOPHAGE) 1000 MG tablet Take 1 tablet (1,000 mg) by mouth 2 times daily (with meals) 360 tablet 1     metoprolol succinate ER (TOPROL-XL) 50 MG 24 hr tablet Take 1 tablet (50 mg) by mouth daily 90 tablet 1        Allergies:   No Known Allergies     Family History: family history is not on file.     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 She has never used smokeless tobacco. She reports that she does not drink alcohol and does not use drugs.    Review of Systems:     Denies numbness, tingling, parasthesias.   Denies headaches.   Denies fevers, chills, night sweats   Denies chest pain.   Denies shortness of breath.   Denies any skin problems, abrasions, rashes, irritation.      Physical Exam  GENERAL  "APPEARANCE: healthy, alert, no distress. ( via iPad)  SKIN: no suspicious lesions or rashes  NEURO: Normal strength and tone, mentation intact and speech normal  PSYCH:  mentation appears normal and affect normal. Not anxious.  RESPIRATORY: No increased work of breathing.    Ht 1.676 m (5' 6\")   Wt 82.6 kg (182 lb)   LMP  (LMP Unknown)   BMI 29.38 kg/m       left WRIST/HAND EXAM:    Skin intact.   Normal wear pattern, color and tone.    There is no swelling in the hand, fingers, wrist of the left upper extremity .  There is no tenderness in the distal radius, distal ulna of the wrist.  Nontender to palpation  over the 1st extensor compartment  negative Finkelsteins test.  There is no maceration of the skin.  There is no gross deformity in the area.  Wrist range of motion: 70 degrees extension, 60 degrees flexion; discomfort at extremes.    Intact sensation light touch median, radial, ulnar nerves of the hand  Intact sensation to the radial and ulnar digital nerves of the fingers, as well as the finger tips.  Intact epl fpl fdp edc wrist flexion/extension biceps/triceps deltoid  Brisk capillary refill to all fingers.   Palpable radial pulse, 2+.      X-rays: no new images today.  3 views left wrist from 5/18/2023 were reviewed in clinic today. On my review, fully healed fracture of the distal radius without any fracture lucency visible. Ununited ulnar styloid fragment.  No new findings. Small lucency in the thumb metacarpal shaft measuring 6 mm, nonspecific but may reflect an enchondroma.       Assessment: 68yo RHD female with nondisplaced, impacted left distal radius fracture status post fall, healed. Also some deQuervains tenosynovitis, improved.        Plan:    * glad to hear she is doing better.  * continue with home exercise program.  * wrist, finger range of motion.  * elevation.  * progress weight bearing per comfort.  * workability: full duties, no restrictions.    * return to clinic as " needed.      * All questions were addressed and answered prior to discharge from clinic today. The patient acknowledges an understanding of and agreement with the plan set forth during today's visit. Patient was advised to call our office or MyChart us if any further questions arise upon leaving our office today.        Kaushik Zhuo M.D., M.S.  Dept. of Orthopaedic Surgery  [facility]        Again, thank you for allowing me to participate in the care of your patient.        Sincerely,        Kasuhik Zhou MD

## 2023-06-15 NOTE — LETTER
Marion 15, 2023      Ara Newby  [address] [address] [address]        To whom it may concern:     Ara Newby is under my care for   1. Closed fracture of distal end of left radius, unspecified fracture morphology, sequela    2. Tenosynovitis, de Quervain         Date of injury: 12/28/22          Return to work date: 6/16/23     ** WITH RESTRICTIONS? No restrictions        Next appointment: As needed        Electronically Signed (as below)  Dr. Kaushik Zhou M.D.

## 2023-07-25 ENCOUNTER — TELEPHONE (OUTPATIENT)
Dept: ORTHOPEDICS | Facility: CLINIC | Age: 68
End: 2023-07-25
Payer: COMMERCIAL

## 2023-07-25 NOTE — TELEPHONE ENCOUNTER
Left message for Catherine from [employer] to return call.     Drake Gonzalez RN on 7/25/2023 at 10:52 AM

## 2023-07-25 NOTE — TELEPHONE ENCOUNTER
Catherine from [facility] returned call and confirmed previous request for recent office visit notes and work restrictions be faxed to [phone].    Writer printed requested information and faxed to [phone], attn. Catherine.    Drake Gonzalez RN on 7/25/2023 at 12:13 PM

## 2023-07-25 NOTE — TELEPHONE ENCOUNTER
Hello,  I'm with ortho con.  Fadumo is calling asking for the most recent visit.  Please call at  for Catherine, fax .  She is asking for most recent visit note, restrictions and if pt has been released from care.  Is there a next appointment.  Thank you.

## 2023-07-26 NOTE — TELEPHONE ENCOUNTER
Called pt with , she is trying to receive payment for her injury that she had occur while at work. Advised her the company that is working on this request has reached out to us and requested records. The pt has given verbal permission for records to be released to [facility] to be processed so that she may be paid. RN informed pt that a written release will be mailed and this needs to be signed and dropped back off at ANY [place] to be scanned into chart. Pt verbalized understanding, confirmed her mailing address, and was thankful for the call.    YANET Swain RN 7/26/2023 4:59 PM

## 2023-10-02 ENCOUNTER — VIRTUAL VISIT (OUTPATIENT)
Dept: FAMILY MEDICINE | Facility: CLINIC | Age: 68
End: 2023-10-02
Payer: COMMERCIAL

## 2023-10-02 ENCOUNTER — ANCILLARY PROCEDURE (OUTPATIENT)
Dept: GENERAL RADIOLOGY | Facility: CLINIC | Age: 68
End: 2023-10-02
Attending: NURSE PRACTITIONER
Payer: COMMERCIAL

## 2023-10-02 DIAGNOSIS — R76.12 POSITIVE QUANTIFERON-TB GOLD TEST: ICD-10-CM

## 2023-10-02 DIAGNOSIS — R76.12 POSITIVE QUANTIFERON-TB GOLD TEST: Primary | ICD-10-CM

## 2023-10-02 PROCEDURE — 99213 OFFICE O/P EST LOW 20 MIN: CPT | Mod: 95 | Performed by: NURSE PRACTITIONER

## 2023-10-02 PROCEDURE — 71046 X-RAY EXAM CHEST 2 VIEWS: CPT | Mod: TC | Performed by: RADIOLOGY

## 2023-10-02 NOTE — PROGRESS NOTES
Ara is a 67 year old who is being evaluated via a billable video visit.      How would you like to obtain your AVS? MyChart  If the video visit is dropped, the invitation should be resent by: Text to cell phone: [phone]  Will anyone else be joining your video visit?           Assessment & Plan     (R76.12) Positive QuantiFERON-TB Gold test  (primary encounter diagnosis)  Comment: report of positive test at [facility]. Had xray tech read me the results. Cxr ordered. Referred to ID for eval.   Plan: XR Chest 2 Views, Adult Infectious Disease          Referral              AMELIA Pace [facility] JEAN Bullock is a 67 year old, presenting for the following health issues:  Results      HPI       Went to [facility] and had positive quantiferon gold   No cough, SOB, CP     She is from [place]  Has had cxr in the past that was negative       Review of Systems   Detailed as above       Objective           Vitals:  No vitals were obtained today due to virtual visit.    Physical Exam   GENERAL: Healthy, alert and no distress  EYES: Eyes grossly normal to inspection.  No discharge or erythema, or obvious scleral/conjunctival abnormalities.  RESP: No audible wheeze, cough, or visible cyanosis.  No visible retractions or increased work of breathing.    SKIN: Visible skin clear. No significant rash, abnormal pigmentation or lesions.  NEURO: Cranial nerves grossly intact.  Mentation and speech appropriate for age.  PSYCH: Mentation appears normal, affect normal/bright, judgement and insight intact, normal speech and appearance well-groomed.          Video-Visit Details    Type of service:  Video Visit   Video Start Time: 5:31 PM  Video End Time:5:40 PM    Originating Location (pt. Location): Home    Distant Location (provider location):  On-site  Platform used for Video Visit: MelvinPhan[employer]

## 2023-10-03 ENCOUNTER — TELEPHONE (OUTPATIENT)
Dept: INFECTIOUS DISEASES | Facility: CLINIC | Age: 68
End: 2023-10-03
Payer: COMMERCIAL

## 2023-10-03 NOTE — TELEPHONE ENCOUNTER
ANABEL + fara called 10/3 to see if patient can come for an new appt with Era today at 2 per Mylene, pt said no. I gave her the option of 10/31, she said she wasn't sure about her work schedule so I suggested for her to call ID when she was ready.

## 2023-10-03 NOTE — RESULT ENCOUNTER NOTE
Please call Ara to tell her the CXR does not show active TB but she must see infectious disease still. She also had some fluid in her lungs so needs to see her PCP in the near future.   Thanks

## 2023-10-11 ENCOUNTER — TELEPHONE (OUTPATIENT)
Dept: FAMILY MEDICINE | Facility: CLINIC | Age: 68
End: 2023-10-11
Payer: COMMERCIAL

## 2023-10-11 NOTE — LETTER
October 12, 2023      Ara Newby  [address] [address] [address] [facility] [address]        [facility]:      Chest xray does not show active TB but with a positive blood test the patient must see infectious disease.         If you have any questions or concerns, please call the clinic at the number listed above.       Sincerely,        AMELIA Brown CNP

## 2023-10-11 NOTE — TELEPHONE ENCOUNTER
CC: Patient calling requesting for results from chest xray on 10/2/23 to be faxed to [facility] at [phone] (patient's workplace).    Callback if further questions or concerns to patient at [phone]     Aye rTejo RN  [facility]

## 2023-10-12 NOTE — TELEPHONE ENCOUNTER
Pt was previously contacted by infectious disease and did not schedule an appt. It is very important for her to see them as she could potentially still need treatment for TB.     Please fax this note with the xray:     Chest xray does not show active TB but with a positive blood test the patient must see infectious disease.     AMELIA Brown CNP

## 2023-10-18 ENCOUNTER — TELEPHONE (OUTPATIENT)
Dept: FAMILY MEDICINE | Facility: CLINIC | Age: 68
End: 2023-10-18

## 2023-10-18 NOTE — TELEPHONE ENCOUNTER
----- Message from AMELIA Pace CNP sent at 10/17/2023  8:32 PM CDT -----  Please call pt. She had a positive quantiferon test with a negative chest xray. I am concerned she has latent TB. Please explain it as TB that is sitting in her body and could turn active at any time. She must see infectious disease as she will likely need to be treated so she doesn't pass this to anyone in the future. Please stress the importance of this. She had previously declined an appt with ID. Thanks  AMELIA Brown CNP

## 2023-10-18 NOTE — RESULT ENCOUNTER NOTE
Please call pt. She had a positive quantiferon test with a negative chest xray. I am concerned she has latent TB. Please explain it as TB that is sitting in her body and could turn active at any time. She must see infectious disease as she will likely need to be treated so she doesn't pass this to anyone in the future. Please stress the importance of this. She had previously declined an appt with ID. Thanks  AMELIA Brown CNP

## 2023-10-18 NOTE — TELEPHONE ENCOUNTER
Called patient with . Patient feels she does not have TB because she has not felt the symptoms. She acknowledges that she has blood pressure and diabetes issues but does not think that she has TB. She is seen at the clinic consistently and has never had this mentioned. She requested that we tell her MD that she regularly sees. Nurse informed her that her regular MD may be in a different system and encouraged her to speak to her MD the next time she sees them. Patient is somewhat upset by the news. Nurse explained that she would inform the NP she saw of her response.       EVELIA Lamas  [facility]

## 2024-05-01 ENCOUNTER — TELEPHONE (OUTPATIENT)
Dept: INTERNAL MEDICINE | Facility: CLINIC | Age: 69
End: 2024-05-01
Payer: COMMERCIAL

## 2024-05-01 NOTE — TELEPHONE ENCOUNTER
Patient Quality Outreach    Patient is due for the following:   Diabetes -  A1C, LDL (Fasting), Eye Exam, Microalbumin, and Foot Exam  Colon Cancer Screening  Breast Cancer Screening - Mammogram  Physical Annual Wellness Visit      Topic Date Due    COVID-19 Vaccine (4 - 2023-24 season) 09/01/2023    Pneumococcal Vaccine (3 of 3 - PPSV23 or PCV20) 12/27/2023       Next Steps:   Schedule a Annual Wellness Visit    Type of outreach:    Chart review performed, no outreach needed.      Questions for provider review:               Gabrielle Hall, CMA

## 2024-10-02 NOTE — TELEPHONE ENCOUNTER
Routing refill request to provider for review/approval because:  Labs out of range:    Labs not current:    Medication is not active on medication list       Airway patent, TM normal bilaterally, normal appearing mouth, nose congestion, throat, neck supple with full range of motion, no cervical adenopathy.